# Patient Record
Sex: MALE | Race: WHITE | NOT HISPANIC OR LATINO | Employment: FULL TIME | URBAN - METROPOLITAN AREA
[De-identification: names, ages, dates, MRNs, and addresses within clinical notes are randomized per-mention and may not be internally consistent; named-entity substitution may affect disease eponyms.]

---

## 2017-03-29 DIAGNOSIS — F32.9 MAJOR DEPRESSIVE DISORDER, SINGLE EPISODE: ICD-10-CM

## 2017-03-29 DIAGNOSIS — M17.12 PRIMARY OSTEOARTHRITIS OF LEFT KNEE: ICD-10-CM

## 2017-03-29 DIAGNOSIS — R73.9 HYPERGLYCEMIA: ICD-10-CM

## 2017-04-03 ENCOUNTER — APPOINTMENT (OUTPATIENT)
Dept: LAB | Facility: MEDICAL CENTER | Age: 42
End: 2017-04-03
Payer: COMMERCIAL

## 2017-04-03 DIAGNOSIS — F32.9 MAJOR DEPRESSIVE DISORDER, SINGLE EPISODE: ICD-10-CM

## 2017-04-03 LAB
ALBUMIN SERPL BCP-MCNC: 3.9 G/DL (ref 3.5–5)
ALP SERPL-CCNC: 66 U/L (ref 46–116)
ALT SERPL W P-5'-P-CCNC: 75 U/L (ref 12–78)
ANION GAP SERPL CALCULATED.3IONS-SCNC: 7 MMOL/L (ref 4–13)
AST SERPL W P-5'-P-CCNC: 32 U/L (ref 5–45)
BASOPHILS # BLD AUTO: 0.04 THOUSANDS/ΜL (ref 0–0.1)
BASOPHILS NFR BLD AUTO: 1 % (ref 0–1)
BILIRUB SERPL-MCNC: 0.32 MG/DL (ref 0.2–1)
BILIRUB UR QL STRIP: NEGATIVE
BUN SERPL-MCNC: 11 MG/DL (ref 5–25)
CALCIUM SERPL-MCNC: 8.7 MG/DL (ref 8.3–10.1)
CHLORIDE SERPL-SCNC: 102 MMOL/L (ref 100–108)
CLARITY UR: NORMAL
CO2 SERPL-SCNC: 29 MMOL/L (ref 21–32)
COLOR UR: YELLOW
CREAT SERPL-MCNC: 0.91 MG/DL (ref 0.6–1.3)
EOSINOPHIL # BLD AUTO: 0.51 THOUSAND/ΜL (ref 0–0.61)
EOSINOPHIL NFR BLD AUTO: 7 % (ref 0–6)
ERYTHROCYTE [DISTWIDTH] IN BLOOD BY AUTOMATED COUNT: 12.8 % (ref 11.6–15.1)
GFR SERPL CREATININE-BSD FRML MDRD: >60 ML/MIN/1.73SQ M
GLUCOSE SERPL-MCNC: 143 MG/DL (ref 65–140)
GLUCOSE UR STRIP-MCNC: NEGATIVE MG/DL
HCT VFR BLD AUTO: 44.8 % (ref 36.5–49.3)
HGB BLD-MCNC: 15.1 G/DL (ref 12–17)
HGB UR QL STRIP.AUTO: NEGATIVE
KETONES UR STRIP-MCNC: NEGATIVE MG/DL
LEUKOCYTE ESTERASE UR QL STRIP: NEGATIVE
LYMPHOCYTES # BLD AUTO: 2.03 THOUSANDS/ΜL (ref 0.6–4.47)
LYMPHOCYTES NFR BLD AUTO: 28 % (ref 14–44)
MCH RBC QN AUTO: 29.5 PG (ref 26.8–34.3)
MCHC RBC AUTO-ENTMCNC: 33.7 G/DL (ref 31.4–37.4)
MCV RBC AUTO: 88 FL (ref 82–98)
MONOCYTES # BLD AUTO: 0.44 THOUSAND/ΜL (ref 0.17–1.22)
MONOCYTES NFR BLD AUTO: 6 % (ref 4–12)
NEUTROPHILS # BLD AUTO: 4.26 THOUSANDS/ΜL (ref 1.85–7.62)
NEUTS SEG NFR BLD AUTO: 58 % (ref 43–75)
NITRITE UR QL STRIP: NEGATIVE
NRBC BLD AUTO-RTO: 0 /100 WBCS
PH UR STRIP.AUTO: 6.5 [PH] (ref 4.5–8)
PLATELET # BLD AUTO: 266 THOUSANDS/UL (ref 149–390)
PMV BLD AUTO: 8.9 FL (ref 8.9–12.7)
POTASSIUM SERPL-SCNC: 4.5 MMOL/L (ref 3.5–5.3)
PROT SERPL-MCNC: 7.7 G/DL (ref 6.4–8.2)
PROT UR STRIP-MCNC: NEGATIVE MG/DL
RBC # BLD AUTO: 5.11 MILLION/UL (ref 3.88–5.62)
SODIUM SERPL-SCNC: 138 MMOL/L (ref 136–145)
SP GR UR STRIP.AUTO: 1.02 (ref 1–1.03)
TSH SERPL DL<=0.05 MIU/L-ACNC: 0.85 UIU/ML (ref 0.36–3.74)
UROBILINOGEN UR QL STRIP.AUTO: 0.2 E.U./DL
WBC # BLD AUTO: 7.31 THOUSAND/UL (ref 4.31–10.16)

## 2017-04-03 PROCEDURE — 36415 COLL VENOUS BLD VENIPUNCTURE: CPT

## 2017-04-03 PROCEDURE — 81003 URINALYSIS AUTO W/O SCOPE: CPT

## 2017-04-03 PROCEDURE — 84443 ASSAY THYROID STIM HORMONE: CPT

## 2017-04-03 PROCEDURE — 80053 COMPREHEN METABOLIC PANEL: CPT

## 2017-04-03 PROCEDURE — 85025 COMPLETE CBC W/AUTO DIFF WBC: CPT

## 2017-04-07 ENCOUNTER — ALLSCRIPTS OFFICE VISIT (OUTPATIENT)
Dept: OTHER | Facility: OTHER | Age: 42
End: 2017-04-07

## 2017-04-08 ENCOUNTER — APPOINTMENT (OUTPATIENT)
Dept: LAB | Facility: MEDICAL CENTER | Age: 42
End: 2017-04-08
Payer: COMMERCIAL

## 2017-04-08 DIAGNOSIS — R73.9 HYPERGLYCEMIA: ICD-10-CM

## 2017-04-08 LAB
EST. AVERAGE GLUCOSE BLD GHB EST-MCNC: 111 MG/DL
GLUCOSE P FAST SERPL-MCNC: 100 MG/DL (ref 65–99)
HBA1C MFR BLD: 5.5 % (ref 4.2–6.3)

## 2017-04-08 PROCEDURE — 82947 ASSAY GLUCOSE BLOOD QUANT: CPT

## 2017-04-08 PROCEDURE — 36415 COLL VENOUS BLD VENIPUNCTURE: CPT

## 2017-04-08 PROCEDURE — 83036 HEMOGLOBIN GLYCOSYLATED A1C: CPT

## 2017-04-26 ENCOUNTER — ALLSCRIPTS OFFICE VISIT (OUTPATIENT)
Dept: OTHER | Facility: OTHER | Age: 42
End: 2017-04-26

## 2017-04-26 ENCOUNTER — HOSPITAL ENCOUNTER (OUTPATIENT)
Dept: RADIOLOGY | Facility: OTHER | Age: 42
Discharge: HOME | End: 2017-04-26
Payer: COMMERCIAL

## 2017-04-26 DIAGNOSIS — M17.12 PRIMARY OSTEOARTHRITIS OF LEFT KNEE: ICD-10-CM

## 2017-04-26 PROCEDURE — 73564 X-RAY EXAM KNEE 4 OR MORE: CPT

## 2017-04-26 PROCEDURE — 73562 X-RAY EXAM OF KNEE 3: CPT

## 2017-07-04 ENCOUNTER — APPOINTMENT (EMERGENCY)
Dept: CT IMAGING | Facility: HOSPITAL | Age: 42
End: 2017-07-04
Payer: COMMERCIAL

## 2017-07-04 ENCOUNTER — APPOINTMENT (EMERGENCY)
Dept: MRI IMAGING | Facility: HOSPITAL | Age: 42
End: 2017-07-04
Payer: COMMERCIAL

## 2017-07-04 ENCOUNTER — HOSPITAL ENCOUNTER (EMERGENCY)
Facility: HOSPITAL | Age: 42
Discharge: HOME | End: 2017-07-04
Attending: EMERGENCY MEDICINE | Admitting: EMERGENCY MEDICINE
Payer: COMMERCIAL

## 2017-07-04 VITALS
TEMPERATURE: 99 F | WEIGHT: 220 LBS | SYSTOLIC BLOOD PRESSURE: 147 MMHG | OXYGEN SATURATION: 98 % | RESPIRATION RATE: 18 BRPM | HEART RATE: 67 BPM | DIASTOLIC BLOOD PRESSURE: 84 MMHG

## 2017-07-04 DIAGNOSIS — S09.90XA HEAD INJURY, ACUTE, WITHOUT LOSS OF CONSCIOUSNESS, INITIAL ENCOUNTER: Primary | ICD-10-CM

## 2017-07-04 PROCEDURE — 70553 MRI BRAIN STEM W/O & W/DYE: CPT

## 2017-07-04 PROCEDURE — 70450 CT HEAD/BRAIN W/O DYE: CPT

## 2017-07-04 PROCEDURE — 72125 CT NECK SPINE W/O DYE: CPT

## 2017-07-04 PROCEDURE — 99284 EMERGENCY DEPT VISIT MOD MDM: CPT

## 2017-07-04 PROCEDURE — 70486 CT MAXILLOFACIAL W/O DYE: CPT

## 2017-07-04 PROCEDURE — A9585 GADOBUTROL INJECTION: HCPCS | Performed by: PHYSICIAN ASSISTANT

## 2017-07-04 RX ORDER — SERTRALINE HYDROCHLORIDE 100 MG/1
100 TABLET, FILM COATED ORAL
COMMUNITY
End: 2018-10-17 | Stop reason: SDUPTHER

## 2017-07-04 RX ORDER — ONDANSETRON 4 MG/1
4 TABLET, ORALLY DISINTEGRATING ORAL ONCE
Status: COMPLETED | OUTPATIENT
Start: 2017-07-04 | End: 2017-07-04

## 2017-07-04 RX ORDER — ONDANSETRON 4 MG/1
4 TABLET, ORALLY DISINTEGRATING ORAL EVERY 8 HOURS PRN
Qty: 15 TABLET | Refills: 0 | Status: SHIPPED | OUTPATIENT
Start: 2017-07-04 | End: 2017-08-25

## 2017-07-04 RX ADMIN — GADOBUTROL 10 ML: 604.72 INJECTION INTRAVENOUS at 15:14

## 2017-07-04 RX ADMIN — ONDANSETRON 4 MG: 4 TABLET, ORALLY DISINTEGRATING ORAL at 12:23

## 2017-07-05 ENCOUNTER — ALLSCRIPTS OFFICE VISIT (OUTPATIENT)
Dept: OTHER | Facility: OTHER | Age: 42
End: 2017-07-05

## 2017-07-05 DIAGNOSIS — F07.81 POSTCONCUSSIONAL SYNDROME: ICD-10-CM

## 2017-07-05 DIAGNOSIS — E04.1 NONTOXIC SINGLE THYROID NODULE: ICD-10-CM

## 2017-07-05 DIAGNOSIS — D18.02 HEMANGIOMA OF INTRACRANIAL STRUCTURES (HCC): ICD-10-CM

## 2017-07-06 ENCOUNTER — TRANSCRIBE ORDERS (OUTPATIENT)
Dept: ADMINISTRATIVE | Facility: HOSPITAL | Age: 42
End: 2017-07-06

## 2017-07-06 ENCOUNTER — ALLSCRIPTS OFFICE VISIT (OUTPATIENT)
Dept: OTHER | Facility: OTHER | Age: 42
End: 2017-07-06

## 2017-07-06 ENCOUNTER — HOSPITAL ENCOUNTER (OUTPATIENT)
Dept: ULTRASOUND IMAGING | Facility: HOSPITAL | Age: 42
Discharge: HOME | End: 2017-07-06
Payer: COMMERCIAL

## 2017-07-06 DIAGNOSIS — D18.02 HEMANGIOMA OF INTRACRANIAL STRUCTURES (HCC): Primary | ICD-10-CM

## 2017-07-06 DIAGNOSIS — E04.1 NONTOXIC SINGLE THYROID NODULE: ICD-10-CM

## 2017-07-06 DIAGNOSIS — R51.9 FACIAL PAIN: ICD-10-CM

## 2017-07-06 PROCEDURE — 76536 US EXAM OF HEAD AND NECK: CPT

## 2017-07-11 ENCOUNTER — TRANSCRIBE ORDERS (OUTPATIENT)
Dept: ADMINISTRATIVE | Facility: HOSPITAL | Age: 42
End: 2017-07-11

## 2017-07-11 DIAGNOSIS — E04.1 NONTOXIC UNINODULAR GOITER: Primary | ICD-10-CM

## 2017-07-24 ENCOUNTER — HOSPITAL ENCOUNTER (OUTPATIENT)
Dept: CT IMAGING | Facility: HOSPITAL | Age: 42
Discharge: HOME | End: 2017-07-24
Attending: PSYCHIATRY & NEUROLOGY
Payer: COMMERCIAL

## 2017-07-24 ENCOUNTER — HOSPITAL ENCOUNTER (OUTPATIENT)
Dept: RADIOLOGY | Facility: HOSPITAL | Age: 42
Discharge: HOME | End: 2017-07-24
Payer: COMMERCIAL

## 2017-07-24 ENCOUNTER — HOSPITAL ENCOUNTER (OUTPATIENT)
Dept: NON INVASIVE DIAGNOSTICS | Facility: HOSPITAL | Age: 42
Discharge: HOME | End: 2017-07-24
Attending: PSYCHIATRY & NEUROLOGY
Payer: COMMERCIAL

## 2017-07-24 DIAGNOSIS — D18.02 HEMANGIOMA OF INTRACRANIAL STRUCTURES (HCC): ICD-10-CM

## 2017-07-24 DIAGNOSIS — R51.9 FACIAL PAIN: ICD-10-CM

## 2017-07-24 DIAGNOSIS — E04.1 NONTOXIC UNINODULAR GOITER: ICD-10-CM

## 2017-07-24 LAB
ATRIAL RATE: 70 BPM
P AXIS: 33 DEGREES
PR INTERVAL: 150 MS
QRS AXIS: 39 DEGREES
QRSD INTERVAL: 96 MS
QT INTERVAL: 396 MS
QTC INTERVAL: 427 MS
T WAVE AXIS: 11 DEGREES
VENTRICULAR RATE: 70 BPM

## 2017-07-24 PROCEDURE — 93005 ELECTROCARDIOGRAM TRACING: CPT

## 2017-07-24 PROCEDURE — 88173 CYTOPATH EVAL FNA REPORT: CPT | Performed by: DENTIST

## 2017-07-24 PROCEDURE — 10022 HB FNA W/IMAGE: CPT

## 2017-07-24 PROCEDURE — 88172 CYTP DX EVAL FNA 1ST EA SITE: CPT | Performed by: DENTIST

## 2017-07-24 PROCEDURE — 70496 CT ANGIOGRAPHY HEAD: CPT

## 2017-07-24 PROCEDURE — 76942 ECHO GUIDE FOR BIOPSY: CPT

## 2017-07-24 RX ORDER — LIDOCAINE HYDROCHLORIDE 10 MG/ML
2 INJECTION, SOLUTION INFILTRATION; PERINEURAL ONCE
Status: COMPLETED | OUTPATIENT
Start: 2017-07-24 | End: 2017-07-24

## 2017-07-24 RX ADMIN — LIDOCAINE HYDROCHLORIDE 2 ML: 10 INJECTION, SOLUTION INFILTRATION; PERINEURAL at 10:18

## 2017-07-24 RX ADMIN — IOHEXOL 85 ML: 350 INJECTION, SOLUTION INTRAVENOUS at 09:02

## 2017-07-30 ENCOUNTER — GENERIC CONVERSION - ENCOUNTER (OUTPATIENT)
Dept: OTHER | Facility: OTHER | Age: 42
End: 2017-07-30

## 2017-07-31 ENCOUNTER — ALLSCRIPTS OFFICE VISIT (OUTPATIENT)
Dept: OTHER | Facility: OTHER | Age: 42
End: 2017-07-31

## 2017-08-04 ENCOUNTER — APPOINTMENT (OUTPATIENT)
Dept: LAB | Facility: MEDICAL CENTER | Age: 42
End: 2017-08-04
Payer: COMMERCIAL

## 2017-08-04 ENCOUNTER — TRANSCRIBE ORDERS (OUTPATIENT)
Dept: ADMINISTRATIVE | Facility: HOSPITAL | Age: 42
End: 2017-08-04

## 2017-08-04 ENCOUNTER — ALLSCRIPTS OFFICE VISIT (OUTPATIENT)
Dept: OTHER | Facility: OTHER | Age: 42
End: 2017-08-04

## 2017-08-04 DIAGNOSIS — E04.1 NONTOXIC UNINODULAR GOITER: Primary | ICD-10-CM

## 2017-08-04 DIAGNOSIS — E04.1 NONTOXIC UNINODULAR GOITER: ICD-10-CM

## 2017-08-04 DIAGNOSIS — Z01.818 PRE-OP TESTING: ICD-10-CM

## 2017-08-04 LAB
ANION GAP SERPL CALCULATED.3IONS-SCNC: 9 MMOL/L (ref 4–13)
APTT PPP: 31 SECONDS (ref 23–35)
BASOPHILS # BLD AUTO: 0.03 THOUSANDS/ΜL (ref 0–0.1)
BASOPHILS NFR BLD AUTO: 0 % (ref 0–1)
BUN SERPL-MCNC: 15 MG/DL (ref 5–25)
CALCIUM SERPL-MCNC: 9.6 MG/DL (ref 8.3–10.1)
CHLORIDE SERPL-SCNC: 106 MMOL/L (ref 100–108)
CO2 SERPL-SCNC: 27 MMOL/L (ref 21–32)
CREAT SERPL-MCNC: 1.16 MG/DL (ref 0.6–1.3)
EOSINOPHIL # BLD AUTO: 0.14 THOUSAND/ΜL (ref 0–0.61)
EOSINOPHIL NFR BLD AUTO: 2 % (ref 0–6)
ERYTHROCYTE [DISTWIDTH] IN BLOOD BY AUTOMATED COUNT: 12.6 % (ref 11.6–15.1)
GFR SERPL CREATININE-BSD FRML MDRD: 79 ML/MIN/1.73SQ M
GLUCOSE SERPL-MCNC: 96 MG/DL (ref 65–140)
HCT VFR BLD AUTO: 45.3 % (ref 36.5–49.3)
HGB BLD-MCNC: 15.5 G/DL (ref 12–17)
INR PPP: 0.94 (ref 0.86–1.16)
LYMPHOCYTES # BLD AUTO: 2.03 THOUSANDS/ΜL (ref 0.6–4.47)
LYMPHOCYTES NFR BLD AUTO: 29 % (ref 14–44)
MCH RBC QN AUTO: 29.9 PG (ref 26.8–34.3)
MCHC RBC AUTO-ENTMCNC: 34.2 G/DL (ref 31.4–37.4)
MCV RBC AUTO: 88 FL (ref 82–98)
MONOCYTES # BLD AUTO: 0.43 THOUSAND/ΜL (ref 0.17–1.22)
MONOCYTES NFR BLD AUTO: 6 % (ref 4–12)
NEUTROPHILS # BLD AUTO: 4.26 THOUSANDS/ΜL (ref 1.85–7.62)
NEUTS SEG NFR BLD AUTO: 63 % (ref 43–75)
NRBC BLD AUTO-RTO: 0 /100 WBCS
PLATELET # BLD AUTO: 264 THOUSANDS/UL (ref 149–390)
PMV BLD AUTO: 9 FL (ref 8.9–12.7)
POTASSIUM SERPL-SCNC: 4.2 MMOL/L (ref 3.5–5.3)
PROTHROMBIN TIME: 12.6 SECONDS (ref 12.1–14.4)
RBC # BLD AUTO: 5.18 MILLION/UL (ref 3.88–5.62)
SODIUM SERPL-SCNC: 142 MMOL/L (ref 136–145)
WBC # BLD AUTO: 6.91 THOUSAND/UL (ref 4.31–10.16)

## 2017-08-04 PROCEDURE — 85025 COMPLETE CBC W/AUTO DIFF WBC: CPT

## 2017-08-04 PROCEDURE — 85610 PROTHROMBIN TIME: CPT

## 2017-08-04 PROCEDURE — 85730 THROMBOPLASTIN TIME PARTIAL: CPT

## 2017-08-04 PROCEDURE — 80048 BASIC METABOLIC PNL TOTAL CA: CPT

## 2017-08-04 PROCEDURE — 36415 COLL VENOUS BLD VENIPUNCTURE: CPT

## 2017-08-07 ENCOUNTER — GENERIC CONVERSION - ENCOUNTER (OUTPATIENT)
Dept: OTHER | Facility: OTHER | Age: 42
End: 2017-08-07

## 2017-08-25 RX ORDER — MULTIVITAMIN
1 TABLET ORAL DAILY
COMMUNITY
End: 2018-04-20 | Stop reason: SDUPTHER

## 2017-08-25 RX ORDER — AMITRIPTYLINE HYDROCHLORIDE 10 MG/1
25 TABLET, FILM COATED ORAL
COMMUNITY
End: 2017-11-22

## 2017-08-31 ENCOUNTER — ANESTHESIA (OUTPATIENT)
Dept: PERIOP | Facility: HOSPITAL | Age: 42
End: 2017-08-31
Payer: COMMERCIAL

## 2017-08-31 ENCOUNTER — HOSPITAL ENCOUNTER (OUTPATIENT)
Facility: HOSPITAL | Age: 42
Setting detail: OUTPATIENT SURGERY
Discharge: HOME | End: 2017-09-01
Attending: SURGERY | Admitting: SURGERY
Payer: COMMERCIAL

## 2017-08-31 ENCOUNTER — ANESTHESIA EVENT (OUTPATIENT)
Dept: PERIOP | Facility: HOSPITAL | Age: 42
End: 2017-08-31
Payer: COMMERCIAL

## 2017-08-31 VITALS
HEIGHT: 72 IN | HEART RATE: 85 BPM | OXYGEN SATURATION: 98 % | DIASTOLIC BLOOD PRESSURE: 66 MMHG | BODY MASS INDEX: 29.8 KG/M2 | WEIGHT: 220 LBS | RESPIRATION RATE: 16 BRPM | SYSTOLIC BLOOD PRESSURE: 121 MMHG | TEMPERATURE: 98.6 F

## 2017-08-31 DIAGNOSIS — E04.1 NONTOXIC SINGLE THYROID NODULE: ICD-10-CM

## 2017-08-31 PROCEDURE — 88307 TISSUE EXAM BY PATHOLOGIST: CPT | Performed by: SURGERY

## 2017-08-31 PROCEDURE — 88341 IMHCHEM/IMCYTCHM EA ADD ANTB: CPT | Performed by: SURGERY

## 2017-08-31 PROCEDURE — 88342 IMHCHEM/IMCYTCHM 1ST ANTB: CPT | Performed by: SURGERY

## 2017-08-31 PROCEDURE — C1765 ADHESION BARRIER: HCPCS | Performed by: SURGERY

## 2017-08-31 PROCEDURE — 88313 SPECIAL STAINS GROUP 2: CPT | Performed by: SURGERY

## 2017-08-31 RX ORDER — HEPARIN SODIUM 5000 [USP'U]/ML
5000 INJECTION, SOLUTION INTRAVENOUS; SUBCUTANEOUS EVERY 8 HOURS SCHEDULED
Status: DISCONTINUED | OUTPATIENT
Start: 2017-08-31 | End: 2017-09-01 | Stop reason: HOSPADM

## 2017-08-31 RX ORDER — ONDANSETRON 2 MG/ML
4 INJECTION INTRAMUSCULAR; INTRAVENOUS ONCE AS NEEDED
Status: DISCONTINUED | OUTPATIENT
Start: 2017-08-31 | End: 2017-08-31 | Stop reason: HOSPADM

## 2017-08-31 RX ORDER — OXYCODONE HYDROCHLORIDE 10 MG/1
10 TABLET ORAL EVERY 4 HOURS PRN
Status: DISCONTINUED | OUTPATIENT
Start: 2017-08-31 | End: 2017-09-01 | Stop reason: HOSPADM

## 2017-08-31 RX ORDER — ONDANSETRON 2 MG/ML
INJECTION INTRAMUSCULAR; INTRAVENOUS AS NEEDED
Status: DISCONTINUED | OUTPATIENT
Start: 2017-08-31 | End: 2017-08-31 | Stop reason: SURG

## 2017-08-31 RX ORDER — BUPIVACAINE HYDROCHLORIDE 2.5 MG/ML
INJECTION, SOLUTION INFILTRATION; PERINEURAL AS NEEDED
Status: DISCONTINUED | OUTPATIENT
Start: 2017-08-31 | End: 2017-08-31 | Stop reason: HOSPADM

## 2017-08-31 RX ORDER — SODIUM CHLORIDE 9 MG/ML
50 INJECTION, SOLUTION INTRAVENOUS CONTINUOUS
Status: DISCONTINUED | OUTPATIENT
Start: 2017-08-31 | End: 2017-08-31

## 2017-08-31 RX ORDER — SODIUM CHLORIDE, SODIUM LACTATE, POTASSIUM CHLORIDE, CALCIUM CHLORIDE 600; 310; 30; 20 MG/100ML; MG/100ML; MG/100ML; MG/100ML
50 INJECTION, SOLUTION INTRAVENOUS CONTINUOUS
Status: DISCONTINUED | OUTPATIENT
Start: 2017-08-31 | End: 2017-08-31

## 2017-08-31 RX ORDER — LIDOCAINE HYDROCHLORIDE 10 MG/ML
INJECTION, SOLUTION INFILTRATION; PERINEURAL AS NEEDED
Status: DISCONTINUED | OUTPATIENT
Start: 2017-08-31 | End: 2017-08-31 | Stop reason: SURG

## 2017-08-31 RX ORDER — ACETAMINOPHEN 325 MG/1
650 TABLET ORAL EVERY 6 HOURS PRN
Status: DISCONTINUED | OUTPATIENT
Start: 2017-08-31 | End: 2017-09-01 | Stop reason: HOSPADM

## 2017-08-31 RX ORDER — ROCURONIUM BROMIDE 10 MG/ML
INJECTION, SOLUTION INTRAVENOUS AS NEEDED
Status: DISCONTINUED | OUTPATIENT
Start: 2017-08-31 | End: 2017-08-31 | Stop reason: SURG

## 2017-08-31 RX ORDER — PROPOFOL 10 MG/ML
INJECTION, EMULSION INTRAVENOUS AS NEEDED
Status: DISCONTINUED | OUTPATIENT
Start: 2017-08-31 | End: 2017-08-31 | Stop reason: SURG

## 2017-08-31 RX ORDER — OXYCODONE HYDROCHLORIDE 5 MG/1
5 TABLET ORAL EVERY 4 HOURS PRN
Status: DISCONTINUED | OUTPATIENT
Start: 2017-08-31 | End: 2017-09-01 | Stop reason: HOSPADM

## 2017-08-31 RX ORDER — BUTALBITAL, ACETAMINOPHEN AND CAFFEINE 50; 325; 40 MG/1; MG/1; MG/1
1 TABLET ORAL EVERY 4 HOURS PRN
COMMUNITY

## 2017-08-31 RX ORDER — SODIUM CHLORIDE, SODIUM LACTATE, POTASSIUM CHLORIDE, CALCIUM CHLORIDE 600; 310; 30; 20 MG/100ML; MG/100ML; MG/100ML; MG/100ML
20 INJECTION, SOLUTION INTRAVENOUS CONTINUOUS
Status: DISCONTINUED | OUTPATIENT
Start: 2017-08-31 | End: 2017-08-31

## 2017-08-31 RX ORDER — FENTANYL CITRATE 50 UG/ML
INJECTION, SOLUTION INTRAMUSCULAR; INTRAVENOUS AS NEEDED
Status: DISCONTINUED | OUTPATIENT
Start: 2017-08-31 | End: 2017-08-31 | Stop reason: SURG

## 2017-08-31 RX ORDER — SERTRALINE HYDROCHLORIDE 100 MG/1
100 TABLET, FILM COATED ORAL
Status: DISCONTINUED | OUTPATIENT
Start: 2017-08-31 | End: 2017-09-01 | Stop reason: HOSPADM

## 2017-08-31 RX ORDER — ONDANSETRON 2 MG/ML
4 INJECTION INTRAMUSCULAR; INTRAVENOUS EVERY 4 HOURS PRN
Status: DISCONTINUED | OUTPATIENT
Start: 2017-08-31 | End: 2017-09-01 | Stop reason: HOSPADM

## 2017-08-31 RX ORDER — FENTANYL CITRATE/PF 50 MCG/ML
25 SYRINGE (ML) INJECTION
Status: DISCONTINUED | OUTPATIENT
Start: 2017-08-31 | End: 2017-08-31 | Stop reason: HOSPADM

## 2017-08-31 RX ORDER — AMITRIPTYLINE HYDROCHLORIDE 25 MG/1
25 TABLET, FILM COATED ORAL
Status: DISCONTINUED | OUTPATIENT
Start: 2017-08-31 | End: 2017-09-01 | Stop reason: HOSPADM

## 2017-08-31 RX ORDER — MIDAZOLAM HYDROCHLORIDE 1 MG/ML
INJECTION INTRAMUSCULAR; INTRAVENOUS AS NEEDED
Status: DISCONTINUED | OUTPATIENT
Start: 2017-08-31 | End: 2017-08-31 | Stop reason: SURG

## 2017-08-31 RX ORDER — KETOROLAC TROMETHAMINE 30 MG/ML
INJECTION, SOLUTION INTRAMUSCULAR; INTRAVENOUS AS NEEDED
Status: DISCONTINUED | OUTPATIENT
Start: 2017-08-31 | End: 2017-08-31 | Stop reason: SURG

## 2017-08-31 RX ORDER — DOCUSATE SODIUM 100 MG/1
100 CAPSULE, LIQUID FILLED ORAL 2 TIMES DAILY PRN
Status: DISCONTINUED | OUTPATIENT
Start: 2017-08-31 | End: 2017-09-01 | Stop reason: HOSPADM

## 2017-08-31 RX ADMIN — FENTANYL CITRATE 50 MCG: 50 INJECTION, SOLUTION INTRAMUSCULAR; INTRAVENOUS at 13:32

## 2017-08-31 RX ADMIN — MIDAZOLAM HYDROCHLORIDE 2 MG: 1 INJECTION, SOLUTION INTRAMUSCULAR; INTRAVENOUS at 12:34

## 2017-08-31 RX ADMIN — HEPARIN SODIUM 5000 UNITS: 5000 INJECTION, SOLUTION INTRAVENOUS; SUBCUTANEOUS at 21:12

## 2017-08-31 RX ADMIN — SODIUM CHLORIDE, SODIUM LACTATE, POTASSIUM CHLORIDE, AND CALCIUM CHLORIDE 20 ML/HR: .6; .31; .03; .02 INJECTION, SOLUTION INTRAVENOUS at 11:37

## 2017-08-31 RX ADMIN — ACETAMINOPHEN 650 MG: 325 TABLET, FILM COATED ORAL at 19:50

## 2017-08-31 RX ADMIN — OXYCODONE HYDROCHLORIDE 5 MG: 5 TABLET ORAL at 21:15

## 2017-08-31 RX ADMIN — SERTRALINE HYDROCHLORIDE 100 MG: 100 TABLET ORAL at 21:12

## 2017-08-31 RX ADMIN — DEXAMETHASONE SODIUM PHOSPHATE 10 MG: 10 INJECTION INTRAMUSCULAR; INTRAVENOUS at 13:04

## 2017-08-31 RX ADMIN — FENTANYL CITRATE 25 MCG: 50 INJECTION INTRAMUSCULAR; INTRAVENOUS at 15:45

## 2017-08-31 RX ADMIN — PROPOFOL 200 MG: 10 INJECTION, EMULSION INTRAVENOUS at 12:41

## 2017-08-31 RX ADMIN — ONDANSETRON 4 MG: 2 INJECTION INTRAMUSCULAR; INTRAVENOUS at 13:04

## 2017-08-31 RX ADMIN — FENTANYL CITRATE 25 MCG: 50 INJECTION INTRAMUSCULAR; INTRAVENOUS at 15:41

## 2017-08-31 RX ADMIN — HYDROMORPHONE HYDROCHLORIDE 0.2 MG: 1 INJECTION, SOLUTION INTRAMUSCULAR; INTRAVENOUS; SUBCUTANEOUS at 16:00

## 2017-08-31 RX ADMIN — KETOROLAC TROMETHAMINE 30 MG: 30 INJECTION, SOLUTION INTRAMUSCULAR at 14:35

## 2017-08-31 RX ADMIN — SODIUM CHLORIDE 50 ML/HR: 0.9 INJECTION, SOLUTION INTRAVENOUS at 16:19

## 2017-08-31 RX ADMIN — SODIUM CHLORIDE, SODIUM LACTATE, POTASSIUM CHLORIDE, AND CALCIUM CHLORIDE 50 ML/HR: .6; .31; .03; .02 INJECTION, SOLUTION INTRAVENOUS at 15:05

## 2017-08-31 RX ADMIN — FENTANYL CITRATE 100 MCG: 50 INJECTION, SOLUTION INTRAMUSCULAR; INTRAVENOUS at 12:41

## 2017-08-31 RX ADMIN — SODIUM CHLORIDE, SODIUM LACTATE, POTASSIUM CHLORIDE, AND CALCIUM CHLORIDE: .6; .31; .03; .02 INJECTION, SOLUTION INTRAVENOUS at 12:34

## 2017-08-31 RX ADMIN — HYDROMORPHONE HYDROCHLORIDE 0.2 MG: 1 INJECTION, SOLUTION INTRAMUSCULAR; INTRAVENOUS; SUBCUTANEOUS at 15:53

## 2017-08-31 RX ADMIN — FENTANYL CITRATE 50 MCG: 50 INJECTION, SOLUTION INTRAMUSCULAR; INTRAVENOUS at 14:51

## 2017-08-31 RX ADMIN — LIDOCAINE HYDROCHLORIDE 40 MG: 10 INJECTION, SOLUTION INFILTRATION; PERINEURAL at 12:41

## 2017-08-31 RX ADMIN — ROCURONIUM BROMIDE 50 MG: 10 INJECTION, SOLUTION INTRAVENOUS at 12:41

## 2017-08-31 RX ADMIN — HYDROMORPHONE HYDROCHLORIDE 0.2 MG: 1 INJECTION, SOLUTION INTRAMUSCULAR; INTRAVENOUS; SUBCUTANEOUS at 15:48

## 2017-08-31 RX ADMIN — AMITRIPTYLINE HYDROCHLORIDE 25 MG: 25 TABLET, FILM COATED ORAL at 21:12

## 2017-09-01 LAB
PLATELET # BLD AUTO: 252 THOUSANDS/UL (ref 149–390)
PMV BLD AUTO: 9.1 FL (ref 8.9–12.7)

## 2017-09-01 PROCEDURE — 85049 AUTOMATED PLATELET COUNT: CPT | Performed by: SURGERY

## 2017-09-01 RX ADMIN — HEPARIN SODIUM 5000 UNITS: 5000 INJECTION, SOLUTION INTRAVENOUS; SUBCUTANEOUS at 05:27

## 2017-09-07 ENCOUNTER — ALLSCRIPTS OFFICE VISIT (OUTPATIENT)
Dept: OTHER | Facility: OTHER | Age: 42
End: 2017-09-07

## 2017-09-15 ENCOUNTER — ALLSCRIPTS OFFICE VISIT (OUTPATIENT)
Dept: OTHER | Facility: OTHER | Age: 42
End: 2017-09-15

## 2017-10-02 ENCOUNTER — GENERIC CONVERSION - ENCOUNTER (OUTPATIENT)
Dept: OTHER | Facility: OTHER | Age: 42
End: 2017-10-02

## 2017-10-10 ENCOUNTER — ALLSCRIPTS OFFICE VISIT (OUTPATIENT)
Dept: OTHER | Facility: OTHER | Age: 42
End: 2017-10-10
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
## 2017-11-16 ENCOUNTER — ALLSCRIPTS OFFICE VISIT (OUTPATIENT)
Dept: OTHER | Facility: OTHER | Age: 42
End: 2017-11-16

## 2017-11-17 ENCOUNTER — ALLSCRIPTS OFFICE VISIT (OUTPATIENT)
Dept: OTHER | Facility: OTHER | Age: 42
End: 2017-11-17

## 2017-11-17 NOTE — PROGRESS NOTES
Assessment  1  External hemorrhoids (455 3) (K64 4)    Discussion/Summary    New onset symptomatic external hemorrhoid with evidence all ulceration on examination today: Plan is referral to surgery for consideration of treatment and follow-up  Chief Complaint  POSSIBLE HEMORROID      History of Present Illness  HPI: Christa Tavera is here today because over the last several days, he has noticed a lump in the anal area, with itching and burning and occasional blood on the tissue paper when he wipes  He moved his bowels twice a day and does not strain  He drinks plenty of fluids  He has no previous history of hemorrhoids  Active Problems  1  Allergic rhinitis (477 9) (J30 9)   2  Arthritis of left knee (716 96) (M17 12)   3  Bilateral hearing loss (389 9) (H91 93)   4  Blood glucose elevated (790 29) (R73 9)   5  Cavernous hemangioma of brain (228 02) (D18 02)   6  Cervical spondylosis without myelopathy (721 0) (M47 812)   7  Chronic low back pain (724 2,338 29) (M54 5,G89 29)   8  Chronic pain of left knee (169 91,297 17) (M25 562,G89 29)   9  Concussion syndrome (310 2) (F07 81)   10  Depression (311) (F32 9)   11  Elevated glucose (790 29) (R73 09)   12  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)   13  Thyroid nodule (241 0) (E04 1)   14  Umbilical hernia (643 8) (K42 9)   15  Venous hemangioma (228 00) (D18 00)   16  Worsening headaches (784 0) (R51)    Past Medical History  Active Problems And Past Medical History Reviewed: The active problems and past medical history were reviewed and updated today  Social History     · Completed college, bachelors degree   · Currently working   · Denied: History of Drug use   · Former smoker (I15 36) (S67 765)   · Full-time employment   ·    · Social alcohol use (Z78 9)   · Two children    Current Meds   1  Butalbital-APAP-Caffeine -40 MG Oral Capsule; TAKE 1 CAPSULE EVERY 4 HOURS AS NEEDED; Therapy: 31CVK8960 to (Evaluate:07Eti0116);  Last VR:95NIO3997 Ordered   2  Excedrin Extra Strength TABS; TAKE 1 TABLET 3 TIMES DAILY AS NEEDED; Therapy: (Recorded:28Hvk8735) to Recorded   3  Multi-Vitamin TABS; TAKE 1 TABLET DAILY; Therapy: (Recorded:80Jvq3793) to Recorded   4  Sertraline HCl - 100 MG Oral Tablet; take 1 tablet by mouth every day; Therapy: 81IUQ9699 to (071 2200)  Requested for: 30YYD7761; Last CL:89FCT0855 Ordered    The medication list was reviewed and updated today  Allergies  1  No Known Drug Allergies  2  No Known Environmental Allergies   3  No Known Food Allergies    Vitals   Recorded: 80INH1641 10:41AM   Heart Rate 72   Systolic 180   Diastolic 88   Height 5 ft    Weight 231 lb    BMI Calculated 45 11   BSA Calculated 1 98   O2 Saturation 99       Physical Exam   Constitutional Vital signs stable and in no acute distress  On examination the perineum, there is an obvious large external hemorrhoid, and there is a circular shallow ulceration of the proximal visible portion of the external hemorrhoid  Future Appointments    Date/Time Provider Specialty Site   09/06/2018 08:30 AM AARON Jenkins   Ne[facility]surgery [facility]   03/16/2018 03:30 PM Michelle Hernandez MD Surgical [facility] [place]       Signatures   Electronically signed by : AARON Salas ; Nov 16 2017 10:58AM EST                       (Author)

## 2017-11-18 NOTE — CONSULTS
Assessment  1  External hemorrhoids (455 3) (K64 4)   2  Hemorrhage of anus and rectum (569 3) (K62 5)   3  Umbilical hernia (059 5) (K42 9)    Plan  External hemorrhoids    · Lidocaine HCl - 2 % External Gel; APPLY AS DIRECTED   Rx By: Alexandria Fuentes; Dispense: 0 Days ; #:1 X 30 ML Tube; Refill: 2;External hemorrhoids; ROBSON = N; Verified Transmission to [id number] Last Updated By: [employer]; 11/17/2017 8:31:28 AM    Discussion/Summary  Discussion Summary:   ****72-year-old male with external hemorrhoid in superficial ulceration noted  Given this ulceration and the resistance of this hemorrhoid to conservative measures, we will start him immediately on lidocaine for pain control  Will plan external hemorrhoidectomy with biopsy and tissue specimen  Cannot rule out squamous cell or other abnormality of the anal tissue and therefore needed tissue sample as well as to definitively treat and removed this ulcerated skin  need for medical or cardiac clearance in the absence of a history with no previous reaction to anesthesia in this nonsmoker  External hemorrhoidectomy  offered him surgery as soon as possible, next week less than a week from now but given his occupation the very busy time of year that comes with [employer] and food distribution he elected to defer for another week until it is more convenient for her schedule  We discussed reasons for repeat evaluation and indications for presenting to the emergency department  Counseling Documentation With Imm: The patient was counseled regarding Importance of tissue biopsy  Goals and Barriers: The patient has the current Goals: Keep procedure as scheduled  The patent has the current Barriers: His work is not amenable to him taking off next week so we will delay surgery until the following week  Patient's Capacity to Self-Care: Patient is able to Self-Care  Patient Education: Educational resources provided: Hemorrhoid packet     Medication SE Review and Pt Understands Tx: Possible side effects of new medications were reviewed with the patient/guardian today  The treatment plan was reviewed with the patient/guardian  The patient/guardian understands and agrees with the treatment plan   Self Referrals:   Self Referrals: No      Chief Complaint  Chief Complaint Free Text Note Form: Hemmorriod consult, painful and bleeding anytime, not just with BM's  Started a week ago  Tried preparation h and warm sitz baths  Cream helped w/ some irritation but continues to bleed  History of Present Illness  HPI: 61-year-old male who presents for evaluation regarding hemorrhoid  He was seen yesterday by his primary care provider and diagnosed with an ulcerated external hemorrhoid and referred for surgical consultation  reports 2 weeks of conservative management of this external hemorrhoid with preparation H, Sitz bath with no relief  A little bit of bleeding, move his bowels regularly, twice per day without constipation or straining  prior hemorrhoids  No prior surgery on the rectal area  prior reaction to anesthesia  No cardiac history noted  works in food distribution and runs [employer], he notes next week with Thanksgiving would be the worst possible time for him to have a procedure in the out of commission  Review of Systems  Complete-Male:  Constitutional: no fever-- and-- no chills  Eyes: no eye pain  ENT: no earache  Cardiovascular: no chest pain-- and-- no palpitations  Respiratory: no cough  Gastrointestinal: no nausea  Genitourinary: no urinary hesitancy  Musculoskeletal: no joint swelling  Integumentary: no itching  Neurological: no numbness  Psychiatric: no anxiety  Endocrine: no muscle weakness  Hematologic/Lymphatic: no tendency for easy bleeding  Active Problems  1  Allergic rhinitis (477 9) (J30 9)   2  Arthritis of left knee (716 96) (M17 12)   3  Bilateral hearing loss (389 9) (H91 93)   4   Blood glucose elevated (790 29) (R73 9)   5  Cavernous hemangioma of brain (228 02) (D18 02)   6  Cervical spondylosis without myelopathy (721 0) (M47 812)   7  Chronic low back pain (724 2,338 29) (M54 5,G89 29)   8  Chronic pain of left knee (937 03,699 13) (M25 562,G89 29)   9  Concussion syndrome (310 2) (F07 81)   10  Depression (311) (F32 9)   11  Elevated glucose (790 29) (R73 09)   12  External hemorrhoids (455 3) (K64 4)   13  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)   14  Hemorrhage of anus and rectum (569 3) (K62 5)   15  Thyroid nodule (241 0) (E04 1)   16  Umbilical hernia (011 0) (K42 9)   17  Venous hemangioma (228 00) (D18 00)   18  Worsening headaches (784 0) (R51)    Past Medical History  1  History of Obstructive sleep apnea (327 23) (G47 33)  Active Problems And Past Medical History Reviewed: The active problems and past medical history were reviewed and updated today  Surgical History  1  History of Adenoidectomy   2  History of Arthroscopy Knee Left   3  History of Cornea Excimer Laser Photorefractive Keratectomy Bilaterally   4  History of Hernia Repair   5  History of Throat Surgery   6  History of Thyroid Surgery Substernal Thyroidectomy Partial   7  History of Tonsillectomy  Surgical History Reviewed: The surgical history was reviewed and updated today  Family History  Mother    1  Family history of cerebrovascular accident (CVA) (V17 1) (Z82 3)   2  Family history of depression (V17 0) (Z81 8)   3  Family history of hypertension (V17 49) (Z82 49)  Father    4  Family history of depression (V17 0) (Z81 8)   5  Family history of malignant melanoma (V16 8) (Z80 8)   6  Family history of Parkinson's disease (V17 2) (Z82 0)  Grandmother    7  Family history of cerebrovascular accident (CVA) (V17 1) (Z82 3)   8  Family history of depression (V17 0) (Z81 8)  Maternal Grandmother    9  Family history of Bone cancer  Paternal Grandfather    8   Family history of malignant neoplasm of prostate (V16 42) (Z80 42)  Family History Reviewed: The family history was reviewed and updated today  Social History     · Completed college, bachelors degree   · Currently working   · Denied: History of Drug use   · Former smoker (J52 74) (M46 913)   · Full-time employment   ·    · Social alcohol use (Z78 9)   · Two children  Social History Reviewed: The social history was reviewed and updated today  Current Meds   1  Butalbital-APAP-Caffeine -40 MG Oral Capsule; TAKE 1 CAPSULE EVERY 4 HOURS AS NEEDED; Therapy: 56IFU4287 to (Evaluate:56Miu8032); Last Rx:24Mbu4840 Ordered   2  Excedrin Extra Strength TABS; TAKE 1 TABLET 3 TIMES DAILY AS NEEDED; Therapy: (Recorded:25Moe7615) to Recorded   3  Multi-Vitamin TABS; TAKE 1 TABLET DAILY; Therapy: (Recorded:10Oct2017) to Recorded   4  Sertraline HCl - 100 MG Oral Tablet; take 1 tablet by mouth every day; Therapy: 48HMP9619 to 078 [id number] 4061)  Requested for: 99QGC3187; Last FC:22LRL6141 Ordered  Medication List Reviewed: The medication list was reviewed and updated today  Allergies  1  No Known Drug Allergies  2  No Known Environmental Allergies   3  No Known Food Allergies    Vitals  Vital Signs    Recorded: 24ALH4909 08:16AM   Temperature 97 1 F, Tympanic   Heart Rate 68, R Radial   Respiration 16   Systolic 766, LUE, Standing   Diastolic 80, LUE, Standing   Height 6 ft    Weight 231 lb    BMI Calculated 31 33   BSA Calculated 2 26       Physical Exam   Constitutional Pleasant well-appearing well-dressed no acute distress  Appears slightly uncomfortable  Avoiding sitting with pressure on his bottom  Eyes  Conjunctiva and lids: No swelling, erythema, or discharge  Pupils and irises: Equal, round and reactive to light  Sclera non-icteric  Ears, Nose, Mouth, and Throat  External inspection of ears and nose: Normal    Pulmonary  Respiratory effort: No increased work of breathing or signs of respiratory distress     Auscultation of lungs: Clear to auscultation, equal breath sounds bilaterally, no wheezes, no rales, no rhonci  Cardiovascular  Auscultation of heart: Normal rate and rhythm, normal S1 and S2, without murmurs  Abdomen Abdomen with 1-2 fingerbreadth reducible umbilical hernia  Nontender  Remainder abdomen soft and benign with normoactive bowel sounds  Musculoskeletal  Gait and station: Normal  -- Ambulating about the office without any tenderness  Skin  Skin and subcutaneous tissue: Normal without rashes or lesions  Psychiatric  Orientation to person, place and time: Normal    Mood and affect: Normal    Additional Exam:  External rectal examination reveals a 2 x 2 cm hemorrhoid, external stalk with a superficial ulceration that appears raw and almost necrotic  Remainder of external anal examination without evidence of stigmata of hemorrhoids  Future Appointments    Date/Time Provider Specialty Site   12/12/2017 08:45 AM Micky Brown [facility] General Surgery [place] [facility]   09/06/2018 08:30 AM AARON Schaffer   Neurosurgery [facility]   03/16/2018 03:30 PM Hilary Hale MD Surgical [facility] [place]       Signatures   Electronically signed by : Elizabet Charles [facility]; Nov 17 2017  8:40AM EST                       (Author)    Electronically signed by : Janis Wills MD; Nov 17 2017  9:38AM EST                       (Author)

## 2017-11-22 NOTE — PRE-PROCEDURE INSTRUCTIONS
Pre-Surgery Instructions:   Medication Instructions    butalbital-acetaminophen-caffeine (FIORICET,ESGIC) -40 mg per tablet Instructed patient per Anesthesia Guidelines   Multiple Vitamin (MULTIVITAMIN) tablet Instructed patient per Anesthesia Guidelines   sertraline (ZOLOFT) 100 mg tablet Instructed patient per Anesthesia Guidelines  Pt via phone given/reviewed [facility] preop instructions and he has chlorhexadine soap   Pt to hold asa/NSAIDS/vitamins/herbal supplements one week before surgery

## 2017-11-27 ENCOUNTER — ANESTHESIA EVENT (OUTPATIENT)
Dept: PERIOP | Facility: HOSPITAL | Age: 42
End: 2017-11-27
Payer: COMMERCIAL

## 2017-11-29 ENCOUNTER — ANESTHESIA (OUTPATIENT)
Dept: PERIOP | Facility: HOSPITAL | Age: 42
End: 2017-11-29
Payer: COMMERCIAL

## 2017-11-29 ENCOUNTER — HOSPITAL ENCOUNTER (OUTPATIENT)
Facility: HOSPITAL | Age: 42
Setting detail: OUTPATIENT SURGERY
Discharge: HOME | End: 2017-11-29
Attending: SURGERY | Admitting: SURGERY
Payer: COMMERCIAL

## 2017-11-29 VITALS
DIASTOLIC BLOOD PRESSURE: 63 MMHG | TEMPERATURE: 98.3 F | BODY MASS INDEX: 29.8 KG/M2 | HEART RATE: 75 BPM | SYSTOLIC BLOOD PRESSURE: 132 MMHG | RESPIRATION RATE: 16 BRPM | OXYGEN SATURATION: 97 % | HEIGHT: 72 IN | WEIGHT: 220 LBS

## 2017-11-29 DIAGNOSIS — K64.4 RESIDUAL HEMORRHOIDAL SKIN TAGS: ICD-10-CM

## 2017-11-29 PROCEDURE — 88304 TISSUE EXAM BY PATHOLOGIST: CPT | Performed by: SURGERY

## 2017-11-29 RX ORDER — ONDANSETRON 2 MG/ML
4 INJECTION INTRAMUSCULAR; INTRAVENOUS EVERY 4 HOURS PRN
Status: DISCONTINUED | OUTPATIENT
Start: 2017-11-29 | End: 2017-11-29 | Stop reason: HOSPADM

## 2017-11-29 RX ORDER — MIDAZOLAM HYDROCHLORIDE 1 MG/ML
INJECTION INTRAMUSCULAR; INTRAVENOUS AS NEEDED
Status: DISCONTINUED | OUTPATIENT
Start: 2017-11-29 | End: 2017-11-29 | Stop reason: SURG

## 2017-11-29 RX ORDER — SODIUM CHLORIDE 9 MG/ML
125 INJECTION, SOLUTION INTRAVENOUS CONTINUOUS
Status: DISCONTINUED | OUTPATIENT
Start: 2017-11-29 | End: 2017-11-29 | Stop reason: HOSPADM

## 2017-11-29 RX ORDER — LIDOCAINE HYDROCHLORIDE 10 MG/ML
INJECTION, SOLUTION INFILTRATION; PERINEURAL AS NEEDED
Status: DISCONTINUED | OUTPATIENT
Start: 2017-11-29 | End: 2017-11-29 | Stop reason: SURG

## 2017-11-29 RX ORDER — HYDROCODONE BITARTRATE AND ACETAMINOPHEN 5; 325 MG/1; MG/1
1 TABLET ORAL EVERY 4 HOURS PRN
Qty: 10 TABLET | Refills: 0 | Status: SHIPPED | OUTPATIENT
Start: 2017-11-29 | End: 2018-04-20 | Stop reason: ALTCHOICE

## 2017-11-29 RX ORDER — MORPHINE SULFATE 10 MG/ML
2 INJECTION, SOLUTION INTRAMUSCULAR; INTRAVENOUS EVERY 2 HOUR PRN
Status: DISCONTINUED | OUTPATIENT
Start: 2017-11-29 | End: 2017-11-29 | Stop reason: CLARIF

## 2017-11-29 RX ORDER — PROPOFOL 10 MG/ML
INJECTION, EMULSION INTRAVENOUS AS NEEDED
Status: DISCONTINUED | OUTPATIENT
Start: 2017-11-29 | End: 2017-11-29 | Stop reason: SURG

## 2017-11-29 RX ORDER — LIDOCAINE HYDROCHLORIDE AND EPINEPHRINE BITARTRATE 20; .01 MG/ML; MG/ML
INJECTION, SOLUTION SUBCUTANEOUS AS NEEDED
Status: DISCONTINUED | OUTPATIENT
Start: 2017-11-29 | End: 2017-11-29 | Stop reason: HOSPADM

## 2017-11-29 RX ORDER — ONDANSETRON 2 MG/ML
INJECTION INTRAMUSCULAR; INTRAVENOUS AS NEEDED
Status: DISCONTINUED | OUTPATIENT
Start: 2017-11-29 | End: 2017-11-29 | Stop reason: SURG

## 2017-11-29 RX ORDER — MORPHINE SULFATE 2 MG/ML
2 INJECTION, SOLUTION INTRAMUSCULAR; INTRAVENOUS EVERY 2 HOUR PRN
Status: DISCONTINUED | OUTPATIENT
Start: 2017-11-29 | End: 2017-11-29 | Stop reason: HOSPADM

## 2017-11-29 RX ORDER — FENTANYL CITRATE/PF 50 MCG/ML
50 SYRINGE (ML) INJECTION
Status: DISCONTINUED | OUTPATIENT
Start: 2017-11-29 | End: 2017-11-29 | Stop reason: HOSPADM

## 2017-11-29 RX ORDER — ONDANSETRON 2 MG/ML
4 INJECTION INTRAMUSCULAR; INTRAVENOUS ONCE AS NEEDED
Status: DISCONTINUED | OUTPATIENT
Start: 2017-11-29 | End: 2017-11-29 | Stop reason: HOSPADM

## 2017-11-29 RX ORDER — SUCCINYLCHOLINE CHLORIDE 20 MG/ML
INJECTION INTRAMUSCULAR; INTRAVENOUS AS NEEDED
Status: DISCONTINUED | OUTPATIENT
Start: 2017-11-29 | End: 2017-11-29 | Stop reason: SURG

## 2017-11-29 RX ORDER — SODIUM CHLORIDE, SODIUM LACTATE, POTASSIUM CHLORIDE, CALCIUM CHLORIDE 600; 310; 30; 20 MG/100ML; MG/100ML; MG/100ML; MG/100ML
80 INJECTION, SOLUTION INTRAVENOUS CONTINUOUS
Status: DISCONTINUED | OUTPATIENT
Start: 2017-11-29 | End: 2017-11-29 | Stop reason: HOSPADM

## 2017-11-29 RX ORDER — MEPERIDINE HYDROCHLORIDE 50 MG/ML
12.5 INJECTION INTRAMUSCULAR; INTRAVENOUS; SUBCUTANEOUS ONCE AS NEEDED
Status: DISCONTINUED | OUTPATIENT
Start: 2017-11-29 | End: 2017-11-29 | Stop reason: HOSPADM

## 2017-11-29 RX ORDER — ACETAMINOPHEN 325 MG/1
650 TABLET ORAL EVERY 6 HOURS PRN
Status: DISCONTINUED | OUTPATIENT
Start: 2017-11-29 | End: 2017-11-29 | Stop reason: HOSPADM

## 2017-11-29 RX ORDER — FENTANYL CITRATE 50 UG/ML
INJECTION, SOLUTION INTRAMUSCULAR; INTRAVENOUS AS NEEDED
Status: DISCONTINUED | OUTPATIENT
Start: 2017-11-29 | End: 2017-11-29 | Stop reason: SURG

## 2017-11-29 RX ORDER — HYDROCODONE BITARTRATE AND ACETAMINOPHEN 5; 325 MG/1; MG/1
1 TABLET ORAL EVERY 4 HOURS PRN
Status: DISCONTINUED | OUTPATIENT
Start: 2017-11-29 | End: 2017-11-29 | Stop reason: HOSPADM

## 2017-11-29 RX ADMIN — FENTANYL CITRATE 50 MCG: 50 INJECTION INTRAMUSCULAR; INTRAVENOUS at 14:21

## 2017-11-29 RX ADMIN — PROPOFOL 200 MG: 10 INJECTION, EMULSION INTRAVENOUS at 14:29

## 2017-11-29 RX ADMIN — DEXAMETHASONE SODIUM PHOSPHATE 4 MG: 10 INJECTION INTRAMUSCULAR; INTRAVENOUS at 14:39

## 2017-11-29 RX ADMIN — SODIUM CHLORIDE 125 ML/HR: 0.9 INJECTION, SOLUTION INTRAVENOUS at 12:37

## 2017-11-29 RX ADMIN — SUCCINYLCHOLINE CHLORIDE 100 MG: 20 INJECTION, SOLUTION INTRAMUSCULAR; INTRAVENOUS at 14:29

## 2017-11-29 RX ADMIN — FENTANYL CITRATE 100 MCG: 50 INJECTION INTRAMUSCULAR; INTRAVENOUS at 14:40

## 2017-11-29 RX ADMIN — ONDANSETRON HYDROCHLORIDE 4 MG: 2 INJECTION, SOLUTION INTRAVENOUS at 14:39

## 2017-11-29 RX ADMIN — HYDROCODONE BITARTRATE AND ACETAMINOPHEN 1 TABLET: 5; 325 TABLET ORAL at 16:12

## 2017-11-29 RX ADMIN — LIDOCAINE HYDROCHLORIDE 100 MG: 10 INJECTION, SOLUTION INFILTRATION; PERINEURAL at 14:29

## 2017-11-29 RX ADMIN — MIDAZOLAM HYDROCHLORIDE 2 MG: 1 INJECTION, SOLUTION INTRAMUSCULAR; INTRAVENOUS at 14:21

## 2017-11-29 RX ADMIN — CEFAZOLIN SODIUM 2000 MG: 2 SOLUTION INTRAVENOUS at 14:33

## 2017-11-29 RX ADMIN — FENTANYL CITRATE 100 MCG: 50 INJECTION INTRAMUSCULAR; INTRAVENOUS at 14:29

## 2017-11-29 NOTE — DISCHARGE INSTRUCTIONS
[facility] Instructions  Dr Yenni Leung MD, FACS    Sitz baths or warm water bowels are fine  May shower  Use stool softeners or laxatives liberally  1  General: You will feel pulling sensations around the wound or funny aches and pains around the incisions  This is normal  Even minor surgery is a change in your body and this is your bodys way of reaction to it  If you have had abdominal surgery, it may help to support the incision with a small pillow or blanket for comfort when moving or coughing  2  Wound care: Make sure to remove the bandage in about 24 hours, unless instructed otherwise  You usually don't have to redress the wound after 24-48 hours, unless for comfort  Keep the incision clean and dry  Let air get to it  If this Steri-Strips fall off, just keep the wound clean  3  Water: You may shower over the wound, unless there are drain tubes left in place  Do not bathe or use a pool or hot tub until cleared by the physician  You may shower right over the staples or Steri-Strips and packing dry when you are done  4  Activity: You may go up and down stairs, walk as much as you are comfortable, but walk at least 3 times each day  If you have had abdominal surgery, do not lift anything heavier than 15 pounds for at least 2-4 weeks, unless cleared by the doctor  5  Diet: You may resume a regular diet  If you had a same-day surgery or overnight stay surgery, you may wish to eat lightly for a few days: soups, crackers, and sandwiches  You may resume a regular diet when ready  6  Medications: Resume all of your previous medications, unless told otherwise by the doctor  Avoid aspirin or ibuprofen (Advil, Motrin, etc ) products for 2-3 days after the date of surgery  You may, at that time, began to take them again   Tylenol is always fine, unless you are taking any narcotic pain medication containing Tylenol (such as Percocet, Darvocet, Vicodin, or anything containing acetaminophen)  Do not take Tylenol if you're taking these medications  You do not need to take the narcotic pain medications unless you are having significant pain and discomfort  7  Driving: You will need someone to drive you home on the day of surgery  Do not drive or make any important decisions while on narcotic pain medication or 24 hours and after anesthesia or sedation for surgery  Generally, you may drive when your off all narcotic pain medications  8  Upset Stomach: You may take Maalox, Tums, or similar items for an upset stomach  If your narcotic pain medication causes an upset stomach, do not take it on an empty stomach  Try taking it with at least some crackers or toast      9  Constipation: Patients often experienced constipation after surgery  You may take over-the-counter medication for this, such as Metamucil, Senokot, Dulcolax, milk of magnesia, etc  You may take a suppository unless you have had anorectal surgery such as a procedure on your hemorrhoids  If you experience significant nausea or vomiting after abdominal surgery, call the office before trying any of these medications  10  Call the office: If you are experiencing any of the following, fevers above 101 5°, significant nausea or vomiting, if the wound develops drainage and/or is excessive redness around the wound, or if you have significant diarrhea or other worsening symptoms  11  Pain: You may be given a prescription for pain  This will be given to the hospital, the day of surgery  12  Sexual Activity: You may resume sexual activity when you feel ready and comfortable and your incision is sealed and healed without apparent infection risk  13  Urination: If you haven't urinated in 6 hours, go directly to the ER for evaluation for urinary retention       Jerson Garcia, [address]  [place], [address]   Phone: [phone]

## 2017-11-29 NOTE — OP NOTE
HEMORRHOIDECTOMY EXCISION EXTERNAL AND INTERNAL  Postoperative Note  PATIENT NAME: Romana Heart  : 1975  MRN: [id number]  AL OR ROOM 04    Surgery Date: 2017    Pre operative diagnosis:   Residual hemorrhoidal skin tags [K64 4]    Operative Indications:  Symptomatic hemorrhoids    Operative Findings:  One large thrombosed  And ulcerated hemorrhoid excised     Consent:  The risks, benefits, and alternatives to the surgery were discussed with the patient and with the family prior to surgery if present, personally by Dr Daisy Clinton  If the consent was obtained by the physician assistant or other representative, the consent was reviewed once again personally by the operating physician  Common complications particular for this procedure as well as unusual complications were discussed, including but not limited to:  bleeding, wound infection, prolonged wound healing, open wounds, reoperation, leak from the bowel or viscus, leak from the bile duct or injury to adjacent or other organs or blood vessels in the abdomen  Anal fecal incontinence was also discussed as a possible complication  A  was used if necessary  The patient expressed understanding of the issues discussed and wished and consented to the procedure to proceed  All questions were answered  Dr Daisy Clinton personally discussed the informed consent with this patient  Post operative diagnosis and findings:   Post-Op Diagnosis Codes:     * Residual hemorrhoidal skin tags [K64 4]    Procedure:   Procedure(s): HEMORRHOIDECTOMY EXCISION EXTERNAL AND INTERNAL    Surgeon(s) and Role:     * Filemon Najera MD - Primary     * Huy De León DO - Assisting    The Physician Assistant was medically necessary for surgical safety the case including suturing, retraction, and hemostasis  No qualified resident was available  I was present for the entire procedure       Drains:       Specimens:  ID Type Source Tests Collected by Time Destination   1 : HEMORROID TISSUE  INTERNAL AND EXTERNAL Tissue Rectal mucosa TISSUE EXAM Julio Garza MD 2017 [id number]        Estimated Blood Loss:   Minimal    Anesthesia Type:   Choice     Procedure:  After discussion and acceptance of all risks, benefits and alternatives, And confirming the surgical site in the holding room, the patient was taken to the operating room  The patient was placed under anesthesia at the [facility] of the anesthesia department  The patient was placed in the jackknife position and positioned by anesthesia  The leonard-anal skin was prepared with a solution of dilute betadine  A timeout was performed confirming patient name, , and procedure  A leonard-anal block lidocaine with epinephrine was infiltrated  Hill Mccollum anoscope was inserted and the hemorrhoidal petechial defined  The local anesthesia was used to elevate the hemorrhoid  The hemorrhoid was carefully clamped with a Annette clamp on the internal aspect  This was then secured using a 2-0 chromic suture  The hemorrhoid was excised  Great care was taken to avoid injury to the sphincter muscles  Scissors, knife, and cautery were used  The wound was closed in a linear radial fashion with a continuous 2-0 Chromic suture and hemostasis was assured  A tiny few dots of Histoacryl was used to cover the suture line ensuring that the anal skin did not stick together  The anal canal was dressed using lidocaine jelly  A dressing was applied  Some portions of this record may have been generated with voice recognition software  There may be translation, syntax,  or grammatical errors  Occasional wrong word or "sound-a-like" substitutions may have occurred due to the inherent limitations of the voice recognition software  Read the chart carefully and recognize, using context, where substations may have occurred  If you have any questions, please contact the dictating provider for clarification or correction, as needed  Complications: None    Condition: Stable to PACU    SIGNATURE: Lester Jesus MD   DATE: November 29, 2017   TIME: 2:57 PM

## 2017-11-29 NOTE — ANESTHESIA PREPROCEDURE EVALUATION
Review of Systems/Medical History  Patient summary reviewed  Chart reviewed  No history of anesthetic complications     Cardiovascular  Negative cardio ROS    Pulmonary  Sleep apnea , ,        GI/Hepatic  Negative GI/hepatic ROS               Endo/Other  History of thyroid disease (follicular neoplasm) , Arthritis  Comment: S/P Right thyroid lobectomy   GYN       Hematology  Negative hematology ROS      Musculoskeletal  Negative musculoskeletal ROS        Neurology    Headaches,    Psychology   Depression , being treated for depression,            Physical Exam    Airway    Mallampati score: II  TM Distance: >3 FB  Neck ROM: full     Dental   No notable dental hx     Cardiovascular  Comment: Negative ROS, Rhythm: regular, Rate: normal, Cardiovascular exam normal    Pulmonary  Pulmonary exam normal Breath sounds clear to auscultation,     Other Findings        Anesthesia Plan  ASA Score- 2       Anesthesia Type- general with ASA Monitors  Additional Monitors:   Airway Plan:           Induction- intravenous  Informed Consent- Anesthetic plan and risks discussed with patient

## 2017-11-29 NOTE — ANESTHESIA POSTPROCEDURE EVALUATION
Post-Op Assessment Note      CV Status:  Stable    Mental Status:  Alert and awake    Hydration Status:  Euvolemic    PONV Controlled:  Controlled    Airway Patency:  Patent    Post Op Vitals Reviewed: Yes          Staff: Anesthesiologist           BP (P) 135/62 (11/29/17 1554)    Temp      Pulse (P) 77 (11/29/17 1554)   Resp (P) 16 (11/29/17 1554)    SpO2 (P) 97 % (11/29/17 1554)

## 2017-11-29 NOTE — DISCHARGE SUMMARY
Discharge Summary - Zohra Frye 38 y o  male MRN: [id number]    Unit/Bed#: OR [place] Encounter: [id number]      Pre-Operative Diagnosis: Pre-Op Diagnosis Codes:     * Residual hemorrhoidal skin tags [K64 4]    Post-Operative Diagnosis: Post-Op Diagnosis Codes:     * Residual hemorrhoidal skin tags [K64 4]    Procedures Performed:  Procedure(s): HEMORRHOIDECTOMY EXCISION EXTERNAL AND INTERNAL    Surgeon: Roby Mathew MD    See H & P for full details of admission and Operative Note for full details of operations performed  Patient was seen and examined prior to discharge  Provisions for Follow-Up Care:  See After Visit Summary for information related to follow-up care and home orders  Disposition: Home, in stable condition  Planned Readmission: No    Discharge Medications:  See after visit summary for reconciled discharge medications provided to patient and family  Post Operative instructions: Reviewed with patient and/or family  Some portions of this record may have been generated with voice recognition software  There may be translation, syntax,  or grammatical errors  Occasional wrong word or "sound-a-like" substitutions may have occurred due to the inherent limitations of the voice recognition software  Read the chart carefully and recognize, using context, where substations may have occurred  If you have any questions, please contact the dictating provider for clarification or correction, as needed       Signature:   Roby Mathew MD  Date: 11/29/2017 Time: 2:58 PM

## 2017-12-04 ENCOUNTER — GENERIC CONVERSION - ENCOUNTER (OUTPATIENT)
Dept: OTHER | Facility: OTHER | Age: 42
End: 2017-12-04

## 2017-12-06 ENCOUNTER — GENERIC CONVERSION - ENCOUNTER (OUTPATIENT)
Dept: OTHER | Facility: OTHER | Age: 42
End: 2017-12-06

## 2017-12-08 ENCOUNTER — GENERIC CONVERSION - ENCOUNTER (OUTPATIENT)
Dept: OTHER | Facility: OTHER | Age: 42
End: 2017-12-08

## 2017-12-12 ENCOUNTER — ALLSCRIPTS OFFICE VISIT (OUTPATIENT)
Dept: OTHER | Facility: OTHER | Age: 42
End: 2017-12-12

## 2017-12-14 NOTE — PROGRESS NOTES
Assessment    1  History of External hemorrhoids (455 3) (K64 4)    Discussion/Summary    61-year-old male for postoperative check after external hemorrhoidectomy for ulcerated external hemorrhoid  Pathology consistent with benign external hemorrhoid  Reviewed with the patient  does also have a very small umbilical hernia noted on exam before his hemorrhoidectomy  We discussed signs and symptoms of obstruction and indications for repair  He wishes to delay for now and if this is increasingly symptomatic or problematic he will come back to see us to schedule repair  back on an as-needed basis  Chief Complaint  Patient is here today for his first post op appointment  He had a hemorrhoidectomy excision on 11/29/2017  Post-Op  HPI: 61-year-old male status post hemorrhoidectomy on November 29, 2017  Had some significant postoperative anal pain which is now resolved  He is moving his bowels okay  No bleeding  Review of Systems   Constitutional: no fever-- and-- no chills  Active Problems  1  Allergic rhinitis (477 9) (J30 9)   2  Arthritis of left knee (716 96) (M17 12)   3  Bilateral hearing loss (389 9) (H91 93)   4  Blood glucose elevated (790 29) (R73 9)   5  Cavernous hemangioma of brain (228 02) (D18 02)   6  Cervical spondylosis without myelopathy (721 0) (M47 812)   7  Chronic low back pain (724 2,338 29) (M54 5,G89 29)   8  Chronic pain of left knee (748 74,388 21) (M25 562,G89 29)   9  Concussion syndrome (310 2) (F07 81)   10  Depression (311) (F32 9)   11  Elevated glucose (790 29) (R73 09)   12  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)   13  Hemorrhage of anus and rectum (569 3) (K62 5)   14  Thyroid nodule (241 0) (E04 1)   15  Umbilical hernia (362 0) (K42 9)   16  Venous hemangioma (228 00) (D18 00)   17   Worsening headaches (784 0) (R51)    Social History     · Completed college, bachelors degree   · Currently working   · Denied: History of Drug use   · Former smoker (V15 82) (T72 341)   · Full-time employment   ·    · Social alcohol use (Z78 9)   · Two children    Current Meds   1  Butalbital-APAP-Caffeine -40 MG Oral Capsule; TAKE 1 CAPSULE EVERY 4 HOURS AS NEEDED; Therapy: 79NKX3960 to (Evaluate:27Hnb7811); Last Rx:60Cdf2907 Ordered   2  Excedrin Extra Strength TABS; TAKE 1 TABLET 3 TIMES DAILY AS NEEDED; Therapy: (Recorded:2017) to Recorded   3  Lidocaine HCl - 2 % External Gel; APPLY AS DIRECTED; Therapy: 92VMI4506 to (Last Rx:2017)  Requested for: 23KSS0192 Ordered   4  Multi-Vitamin TABS; TAKE 1 TABLET DAILY; Therapy: (Recorded:2017) to Recorded   5  Sertraline HCl - 100 MG Oral Tablet; take 1 tablet by mouth every day; Therapy: 77WCZ8421 to (Evaluate:2018)  Requested for: 56KOC7677; Last [id number] Ordered    Allergies  1  No Known Drug Allergies  2  No Known Environmental Allergies   3  No Known Food Allergies    Vitals   Recorded: 2017 08:13AM   Temperature 97 9 F   Heart Rate 72   Respiration 16   Systolic 763   Diastolic 76   Height 6 ft    Weight 229 lb 8 oz   BMI Calculated 31 13   BSA Calculated 2 26       Physical Exam   Constitutional Pleasant well-appearing 29-year-old male  No acute distress  Additional Exam:  External rectal examination with well-healed area from prior hemorrhoidectomy  No ulceration or open draining area  Scar tissue with good healing and approximation is appreciated  Future Appointments    Date/Time Provider Specialty Site   2018 08:30 AM AARON Abbott   [facility]surgery [facility]   2018 03:30 PM Robson Mario MD Surgical [facility] [place]       Signatures   Electronically signed by : Virlinda Lombard, [facility]; Dec 12 2017  8:21AM EST                       (Author)    Electronically signed by : Suzie Reyna MD; Dec 13 2017  9:57AM EST

## 2018-01-12 VITALS
DIASTOLIC BLOOD PRESSURE: 72 MMHG | WEIGHT: 231.25 LBS | HEIGHT: 72 IN | SYSTOLIC BLOOD PRESSURE: 132 MMHG | BODY MASS INDEX: 31.32 KG/M2 | HEART RATE: 77 BPM

## 2018-01-12 VITALS
SYSTOLIC BLOOD PRESSURE: 138 MMHG | HEART RATE: 80 BPM | BODY MASS INDEX: 30.42 KG/M2 | DIASTOLIC BLOOD PRESSURE: 91 MMHG | WEIGHT: 224.56 LBS | HEIGHT: 72 IN

## 2018-01-12 VITALS
HEART RATE: 80 BPM | DIASTOLIC BLOOD PRESSURE: 88 MMHG | BODY MASS INDEX: 30.78 KG/M2 | HEIGHT: 72 IN | SYSTOLIC BLOOD PRESSURE: 118 MMHG | RESPIRATION RATE: 14 BRPM | TEMPERATURE: 98.2 F | WEIGHT: 227.25 LBS

## 2018-01-12 VITALS
WEIGHT: 230 LBS | SYSTOLIC BLOOD PRESSURE: 122 MMHG | HEART RATE: 89 BPM | DIASTOLIC BLOOD PRESSURE: 84 MMHG | OXYGEN SATURATION: 96 % | BODY MASS INDEX: 31.19 KG/M2

## 2018-01-12 VITALS
DIASTOLIC BLOOD PRESSURE: 62 MMHG | WEIGHT: 226.13 LBS | RESPIRATION RATE: 16 BRPM | HEART RATE: 80 BPM | HEIGHT: 72 IN | BODY MASS INDEX: 30.63 KG/M2 | SYSTOLIC BLOOD PRESSURE: 122 MMHG

## 2018-01-12 VITALS
RESPIRATION RATE: 15 BRPM | OXYGEN SATURATION: 98 % | HEIGHT: 72 IN | HEART RATE: 92 BPM | WEIGHT: 230 LBS | DIASTOLIC BLOOD PRESSURE: 82 MMHG | TEMPERATURE: 98.4 F | SYSTOLIC BLOOD PRESSURE: 124 MMHG | BODY MASS INDEX: 31.15 KG/M2

## 2018-01-12 VITALS — SYSTOLIC BLOOD PRESSURE: 125 MMHG | HEART RATE: 83 BPM | WEIGHT: 232.5 LBS | DIASTOLIC BLOOD PRESSURE: 85 MMHG

## 2018-01-13 VITALS
TEMPERATURE: 97.1 F | BODY MASS INDEX: 31.29 KG/M2 | DIASTOLIC BLOOD PRESSURE: 80 MMHG | HEIGHT: 72 IN | HEART RATE: 68 BPM | RESPIRATION RATE: 16 BRPM | WEIGHT: 231 LBS | SYSTOLIC BLOOD PRESSURE: 130 MMHG

## 2018-01-13 VITALS
BODY MASS INDEX: 31.02 KG/M2 | OXYGEN SATURATION: 98 % | HEART RATE: 83 BPM | RESPIRATION RATE: 15 BRPM | DIASTOLIC BLOOD PRESSURE: 84 MMHG | TEMPERATURE: 95.4 F | HEIGHT: 72 IN | SYSTOLIC BLOOD PRESSURE: 128 MMHG | WEIGHT: 229 LBS

## 2018-01-13 NOTE — PROGRESS NOTES
Preliminary Nursing Report                Patient Information    Initial Encounter Entry Date:   2017 10:16 AM EST (Automated Transmission Automated Transmission)       Last Modified:   {Evita Licea}              Legal Name: Casey Olmos Number:        YOB: 1975        Age (years): 46        Gender: M        Body Mass Index (BMI): 31 kg/m2        Height: 72 in  Weight: 230 lbs (104 kgs)           Address:   [address] [place] [address],[address]              Phone: [phone]   (consent to leave messages)        Email:        Ethnicity: Decline to State        Muslim:        Marital Status:        Preferred Language: English        Race: Other Race                    Patient Insurance Information        Primary Insurance Information Carrier Name: {Primary  CarrierName}           Carrier Address:   {Primary  CarrierAddress}              Carrier Phone: {Primary  CarrierPhone}          Group Number: {Primary  GroupNumber}          Policy Number: {Primary  PolicyNumber}          Insured Name: {Primary  InsuredName}          Insured : {Primary  InsuredDOB}          Relationship to Insured: {Primary  RelationshiptoInsured}           Secondary Insurance Information Carrier Name: {Secondary  CarrierName}           Carrier Address:   {Secondary  CarrierAddress}              Carrier Phone: {Secondary  CarrierPhone}          Group Number: {Secondary  GroupNumber}          Policy Number: {Secondary  PolicyNumber}          Insured Name: {Secondary  InsuredName}          Insured : {Secondary  InsuredDOB}          Relationship to Insured: {Secondary  RelationshiptoInsured}                       Health Profile   Booking #:   Lucy Apodaca #: [id number]               DOS: 2017    Surgery : TOTAL THYROID LOBECTOMY, UNILATERAL    Add'l Procedures/Notes:     Surgery Risk: Intermediate          Precautions          Allergies    No Known Drug Allergies Medications    Amitriptyline HCl - 10 MG Oral Tablet       Butalbital-APAP-Caffeine -40 MG Oral Capsule       Excedrin Extra Strength TABS       Sertraline HCl - 100 MG Oral Tablet       TraMADol HCl - 50 MG Oral Tablet               Conditions    Allergic rhinitis       Arthritis of left knee       Bilateral hearing loss       Blood glucose elevated       Cavernous hemangioma of brain       Cervical spondylosis without myelopathy       Chronic low back pain       Chronic pain of left knee       Concussion syndrome       Depression       Elevated glucose       Follicular neoplasm of thyroid       Thyroid nodule       Umbilical hernia       Venous hemangioma       Worsening headaches               Family History    None             Surgical History    None             Social History    Currently working       Former smoker       Social alcohol use                               Patient Instructions       Medical Procedure Risk  NPO Instructions   The day before surgery it is recommended to have a light dinner at your usual time and you are allowed a light snack early in the evening  Do not eat anything heavy or eat a big meal after 7pm  Do not eat or drink anything after midnight prior to your surgery  If you are supposed to take any of your medications, do so with a sip of water  Failure to follow these instructions can lead to an increased risk of lung complications and may result in a delay or cancellation of your procedure  If you have any questions, contact your institution for further instructions  No candy, no gum, no mints, no chewing tobacco          TraMADol HCl - 50 MG Oral Tablet  Medication Instruction (Opioids - Pain Medication) 62  Please continue the following medications, if needed, up to and including the day of surgery (with a sip of water)           Sertraline HCl - 100 MG Oral Tablet  Medication Instruction (SSRI - Antidepressants) 80  Please continue to take this medication on your normal schedule  If this is an oral medication and you take in the morning, you may do so with a sip of water  Testing Considerations       ? Complete Blood Count (CBC) t  If test was completed and normal within last six months, repeat test is not necessary  Triggered by: Thyroid nodule         ? Comprehensive Metabolic Panel (CMP) t  If test was completed and normal within last six months, repeat test is not necessary  Triggered by: Thyroid nodule         ? Electrocardiogram (ECG) t  Patient does not need new test if normal ECG is present within the last six months and no change in clinical condition  Triggered by: Thyroid nodule         ? Thyroid function tests (TFTs) t  Consider Thyroid Function Tests if there is a change in condition  Triggered by: Thyroid nodule               Consultations       ? Primary Care Physician Evaluation   Primary care physician may need to evaluate patient prior to surgery  This is likely NOT necessary if the listed conditions are chronic and stable  Triggered by: Thyroid nodule               Miscellaneous Questions         Question: Are you able to walk up a flight of stairs, walk up a hill or do heavy housework WITHOUT having chest pain or shortness of breath? Answer: YES                   Allergies/Conditions/Medications Not Found        The following were not recognized by our system when generating the recommendations  Please consider if this would impact any preoperative protocols  ? Currently working       ? Social alcohol use       ? Amitriptyline HCl - 10 MG Oral Tablet       ? Excedrin Extra Strength TABS                  Appointment Information         Date:    08/31/2017        Location:    [place]        Address:           Directions:                      Footnotes revision 14      ?? Denotes a free-text entry        Legal Disclaimer: Any and all recommendations and services provided herein are designed to assist in the preoperative care of the patient  Nothing contained herein is designed to replace, eliminate or alleviate the responsibility of the attending physician to supervise and determine the patient?s preoperative care and course of treatment  Failure to provide complete, accurate information may negatively impact the system?s ability to recommend the proper preoperative protocol  THE ATTENDING PHYSICIAN IS RESPONSIBLE TO REVIEW THE SUGGESTED PREOPERATIVE PROTOCOLS/COURSE OF TREATMENT AND PRESCRIBE THE FINAL COURSE OF PREOPERATIVE TREATMENT IN CONSULTATION WITH THE PATIENT  THE ePREOP SYSTEM AND ITS MATERIALS ARE PROVIDED ? AS IS? WITHOUT WARRANTY OF ANY KIND, EXPRESS OR IMPLIED, INCLUDING, BUT NOT LIMITED TO, WARRANTIES OF PERFORMANCE OR MERCHANTABILITY OR FITNESS FOR A PARTICULAR PURPOSE  PATIENT AND PHYSICIANS HEREBY AGREE THAT THEIR USE OF THE MATERIALS AND RESOURCES ACT AS A CONSENT TO RELEASE AND WAIVE ePREOP FROM ANY AND ALL CLAIMS OF WARRANTY, TORT OR CONTRACT LAW OF ANY KIND             Electronically signed by:Roderick Donney Homans MD  Aug 14 2017  4:39PM EST

## 2018-01-13 NOTE — RESULT NOTES
Verified Results  CTA HEAD W 222 [employer] [id number] 07:45AM Kayley Monroy Order Number: [id number]    - Patient Instructions: To schedule this appointment, please contact Central Scheduling at [phone]  Test Name Result Flag Reference   CTA HEAD W WO CONTRAST (Report)     CTA BRAIN - WITH AND WITHOUT CONTRAST     INDICATION: D18 02: Hemangioma of intracranial structures  History taken directly from the electronic ordering system  COMPARISON:  CT head performed on 7/4/2017  MRI performed on 7/4/2017     TECHNIQUE: Routine noncontrast CT brain followed by axial 0 625 mm imaging after administration of intravenous contrast  MIP and 3D reconstructions performed  3D rendering was performed on an independent workstation  Radiation dose length product (DLP) for this visit: 1456 67 mGy-cm   This examination, like all CT scans performed in the [facility], was performed utilizing techniques to minimize radiation dose exposure, including the use of    iterative reconstruction and automated exposure control  IV Contrast: 85 mL of iohexol (OMNIPAQUE)      IMAGE QUALITY: Diagnostic  FINDINGS:     NONCONTRAST BRAIN: There is no acute intracranial hemorrhage, mass effect or midline shift  No extra-axial collection identified  Gray-white differentiation is maintained  Again noted is a focus of hyperdensity within the posterior left medial inferior frontal lobe in keeping with change from cavernous hemangioma  No evidence for surrounding edema to suggest more recent hemorrhage  Cerebral volume is within normal limits for age  No substantial white matter changes are seen  DISTAL INTERNAL CAROTID ARTERIES: Bilateral ICAs are patent with left-sided dominance but this is the product of the left ICA supplying the right TRINY territory  ANTERIOR CIRCULATION: Right A1 is aplastic  Left A1 is dominant with an anterior communicating artery   A2 and A3 segments are within normal limits of caliber  No signs of arterial flow in the area of question which would be consistent with the    presence of a cavernous hemangioma with associated developmental venous and normal      MIDDLE CEREBRAL ARTERY CIRCULATION: MCA vasculature patent bilaterally and within normal limits of caliber  Again no signs of arterial feeders to the aforementioned cavernous hemangioma  DISTAL VERTEBRAL ARTERIES: Normal distal vertebral arteries  Posterior inferior cerebellar artery origins are normal  Normal vertebral basilar junction  BASILAR ARTERY: Basilar artery is normal in caliber  Normal superior cerebellar arteries  POSTERIOR CEREBRAL ARTERIES: Both posterior cerebral arteries arises from the basilar tip  Both arteries demonstrate normal flow-related enhancement  Normal posterior communicating arteries  DURAL VENOUS SINUSES: Normal      BONY STRUCTURES: Unremarkable bony structures  IMPRESSION:     No acute intracranial abnormality  Stable left inferior frontal cavernous hemangioma without evidence for more recent hemorrhage  No evidence for vascular nidus or abnormality or arterial abnormality to account for aforementioned lesion (confirmatory of a cavernous hemangioma)  No signs of intracranial vascular occlusive disease or aneurysm formation          Workstation performed: [id number]     Signed by:   Cristina Sykes MD   7/24/17     ECG 12-LEAD [id number] 07:45AM Summerdalettkirill Mccormack     Test Name Result Flag Reference   ECG 12-LEAD      Normal sinus rhythm   Normal ECG   No previous ECGs available   Confirmed by AARON Townsend , Adrienne Denny ([phone]) on 7/24/2017 9:15:39 AM

## 2018-01-15 VITALS
WEIGHT: 231 LBS | DIASTOLIC BLOOD PRESSURE: 88 MMHG | BODY MASS INDEX: 45.35 KG/M2 | OXYGEN SATURATION: 99 % | HEIGHT: 60 IN | SYSTOLIC BLOOD PRESSURE: 128 MMHG | HEART RATE: 72 BPM

## 2018-01-16 NOTE — PROGRESS NOTES
Assessment    1  Encounter for preventive health examination (V70 0) (Z00 00)   2  Depression (311) (F32 9)   3  Elevated glucose (790 29) (R73 09)   4  Arthritis of left knee (716 96) (M17 12)   5  Allergic rhinitis (477 9) (J30 9)   6  Bilateral hearing loss (389 9) (H91 93)   7  Blood glucose elevated (790 29) (R73 9)   8  Umbilical hernia (259 3) (K42 9)   9  Chronic low back pain (724 2,338 29) (M54 5,G89 29)    Plan  Allergic rhinitis    · Fluticasone Propionate 50 MCG/ACT Nasal Suspension; USE 2 SPRAYS IN EACH  NOSTRIL ONCE DAILY    Discussion/Summary    Elevated glucose on blood chemistries: Plan is to repeat fasting glucose tomorrow with hemoglobin A1c and further advise  Family history melanoma in his father: Referred to advanced dermatology for long-term surveillance  Chronic arthritis of left knee status post left knee surgery for loose patella syndrome: Referral to [facility] orth[facility]  Chronic low back pain: Plan is to improve the arthritis symptoms, hopefully balancing his gait, and giving him the opportunity to exercise and lose weight which may help his back  Allergic rhinitis: Continue fluticasone nasal spray seasonally  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 and continue sertraline indefinitely  Next checkup was recommended for one year  Chief Complaint  yearly physical       History of Present Illness  HPI: Pranav Tidwell is a very nice 68-year-old gentleman who is here today for annual checkup  I last saw him in June of last year when he moved back to the area  He's been doing well overall but does have some new issues  Seasonal rhinitis: This tends to appear in the spring and fall, without lower respiratory symptoms, mainly nasal congestion and watery and itchy eyes  Over-the-counter fluticasone nasal sprays were quite well and will be continued      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 for him and he does not have depression on this medicine and will need this indefinitely  This will be renewed as needed  He is taking this medicine daily  Elevated glucose: His annual labs from April 3 were reviewed and included a fasting glucose of 143 with no symptoms of hyperglycemia, no previous history of elevated glucose, no family history of diabetes  Dresses blood chemistries were normal, and CBC was normal, other slightly elevated eosinophils consistent with seasonal allergies, with normal urinalysis as well  Arthritis of left knee: This is chronic and he had patellar realignment when in the [place] about 12 years ago  He has pain with activity and this limits his ability to exercise  He is occasional swelling of his knee as well and activity  There is no instability  Chronic low back pain: He sees a chiropractor once a month with history of lumbar disc disease without radiculopathy symptoms  This is an injury he sustained in the [employer]  He gets good relief from seeing the chiropractor  He has bilateral hearing aids and these are working well  He reports normal vision and he is up-to-date with dental care  His wife and children are doing well  Active Problems    1   Depression (311) (F32 9)    Past Medical History    · History of Obstructive sleep apnea (327 23) (G47 33)    Surgical History    · History of Arthroscopy Knee Left   · History of Cornea Excimer Laser Photorefractive Keratectomy Bilaterally   · History of Hernia Repair    Family History  Mother    · Family history of cerebrovascular accident (CVA) (V17 1) (Z82 3)   · Family history of depression (V17 0) (Z81 8)   · Family history of hypertension (V17 49) (Z82 49)  Father    · Family history of depression (V17 0) (Z81 8)   · Family history of malignant melanoma (V16 8) (Z80 8)   · Family history of Parkinson's disease (V17 2) (Z82 0)  Grandmother    · Family history of cerebrovascular accident (CVA) (V17 1) (Z82 3)   · Family history of depression (V17 0) (Z81 8)  Maternal Grandmother    · Family history of Bone cancer  Paternal Grandfather    · Family history of malignant neoplasm of prostate (E15 36) (Z80 42)    Social History    · Currently working   · Former smoker (M30 43) (O26 369)   · Social alcohol use (Z78 9)    Current Meds   1  Sertraline HCl - 100 MG Oral Tablet; Take 1 tablet daily; Therapy: 24KNX4962 to (Evaluate:04Mar2017); Last Rx:07Jun2016 Ordered    Allergies    1  No Known Drug Allergies    Vitals   Recorded: 07Apr2017 07:58AM   Heart Rate 92   Systolic 262, LUE, Sitting   Diastolic 82, LUE, Sitting   Height 6 ft    Weight 230 lb 6 oz   BMI Calculated 31 24   BSA Calculated 2 26   O2 Saturation 97     Physical Exam    Constitutional Very pleasant gentleman, vital signs stable and afebrile  Hearing is well compensated with hearing aids  ENT exam notable for clear nasal discharge and nasal membrane redness and mild swelling, clear postnasal drainage without airway compromise and evidence of allergic conjunctivitis of both eyes  External canals and tympanic membranes are normal  There is no head or neck mass or adenopathy  There is no JVD  Carotid pulses are normal  Pharynx exam is normal  Skin notable for no rash or malignancy  Lungs are clear cardiac exam is normal and auscultation  Abdomen: Small umbilical hernia which appears unchanged, normal bowel sounds, nontender and soft without masses bruits or organomegaly  There is a scar extending sagittally over the knee from the distal left anterior upper leg to the tibial plateau  There is no swelling of the joint, there is some grinding on passive range of motion of the left knee that seems to be in the anterior compartment  Circulation is intact   Mood is optimal       Signatures   Electronically signed by : AARON Jones ; Apr 7 2017  8:28AM EST                       (Author)

## 2018-01-22 VITALS
SYSTOLIC BLOOD PRESSURE: 128 MMHG | HEART RATE: 72 BPM | DIASTOLIC BLOOD PRESSURE: 76 MMHG | BODY MASS INDEX: 31.08 KG/M2 | WEIGHT: 229.5 LBS | RESPIRATION RATE: 16 BRPM | HEIGHT: 72 IN | TEMPERATURE: 97.9 F

## 2018-01-22 VITALS
HEIGHT: 72 IN | BODY MASS INDEX: 31.2 KG/M2 | DIASTOLIC BLOOD PRESSURE: 82 MMHG | WEIGHT: 230.38 LBS | HEART RATE: 92 BPM | SYSTOLIC BLOOD PRESSURE: 124 MMHG | OXYGEN SATURATION: 97 %

## 2018-01-23 NOTE — MISCELLANEOUS
Message   Recorded as Task   Date: 11/30/2017 10:10 AM, Created By: Kasandra Guardado   Task Name: Follow Up   [facility]   Regarding Patient: Troy Hansen, Status: Active   CommentBob Burk - 30 Nov 2017 10:10 AM     TASK CREATED  Spoke w/ pt post hemorroidectomy, ambulating ad rafy and is back to regular diet  Using pain meds and colace  No bleeding noted at this time  Advised to contact office w/ questions or concerns prior to post op ov  Is aware pathology pending  Kasandra Guardado - 06 Dec 2017 9:47 AM     TASK EDITED  Spoke w/ pt he is feeling better, pain meds are helping  Aware of pathology results adenoma  Will f/u at post op ov 12/12  Active Problems    1  Allergic rhinitis (477 9) (J30 9)   2  Arthritis of left knee (716 96) (M17 12)   3  Bilateral hearing loss (389 9) (H91 93)   4  Blood glucose elevated (790 29) (R73 9)   5  Cavernous hemangioma of brain (228 02) (D18 02)   6  Cervical spondylosis without myelopathy (721 0) (M47 812)   7  Chronic low back pain (724 2,338 29) (M54 5,G89 29)   8  Chronic pain of left knee (769 63,353 47) (M25 562,G89 29)   9  Concussion syndrome (310 2) (F07 81)   10  Depression (311) (F32 9)   11  Elevated glucose (790 29) (R73 09)   12  External hemorrhoids (455 3) (K64 4)   13  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)   14  Hemorrhage of anus and rectum (569 3) (K62 5)   15  Thyroid nodule (241 0) (E04 1)   16  Umbilical hernia (546 1) (K42 9)   17  Venous hemangioma (228 00) (D18 00)   18  Worsening headaches (784 0) (R51)    Current Meds   1  Butalbital-APAP-Caffeine -40 MG Oral Capsule; TAKE 1 CAPSULE EVERY 4   HOURS AS NEEDED; Therapy: 79EUJ2404 to (Evaluate:32Sbr1032); Last Rx:36Igg9172 Ordered   2  Excedrin Extra Strength TABS; TAKE 1 TABLET 3 TIMES DAILY AS NEEDED; Therapy: (Recorded:50Wai4793) to Recorded   3  Lidocaine HCl - 2 % External Gel; APPLY AS DIRECTED;    Therapy: 78BBQ2624 to (Last Rx:97Wsf2090)  Requested for: 15OBV6132 Ordered   4  Multi-Vitamin TABS; TAKE 1 TABLET DAILY; Therapy: (Recorded:10Oct2017) to Recorded   5  Sertraline HCl - 100 MG Oral Tablet; take 1 tablet by mouth every day; Therapy: 08JHT4447 to (Evaluate:26Jan2018)  Requested for: 53BXY4031; Last   SA:86IST6661 Ordered    Allergies    1  No Known Drug Allergies    2  No Known Environmental Allergies   3   No Known Food Allergies    Signatures   Electronically signed by : Celina Rahman, ; Dec  6 2017  9:47AM EST                       (Author)

## 2018-01-23 NOTE — MISCELLANEOUS
Message   Recorded as Task   Date: 12/07/2017 03:22 PM, Created By: Kevin Scott   Task Name: Call Back   Assigned To: [facility],Team   Regarding Patient: Bautista Nolasco, Status: Active   Lana Urias - 07 Dec 2017 3:22 PM     TASK CREATED  Please call with benign pathology  Hemorrhoid seen  Frederic Grimes - 08 Dec 2017 11:25 AM     TASK EDITED  Pt is aware of result  Active Problems    1  Allergic rhinitis (477 9) (J30 9)   2  Arthritis of left knee (716 96) (M17 12)   3  Bilateral hearing loss (389 9) (H91 93)   4  Blood glucose elevated (790 29) (R73 9)   5  Cavernous hemangioma of brain (228 02) (D18 02)   6  Cervical spondylosis without myelopathy (721 0) (M47 812)   7  Chronic low back pain (724 2,338 29) (M54 5,G89 29)   8  Chronic pain of left knee (063 59,002 77) (M25 562,G89 29)   9  Concussion syndrome (310 2) (F07 81)   10  Depression (311) (F32 9)   11  Elevated glucose (790 29) (R73 09)   12  External hemorrhoids (455 3) (K64 4)   13  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)   14  Hemorrhage of anus and rectum (569 3) (K62 5)   15  Thyroid nodule (241 0) (E04 1)   16  Umbilical hernia (012 6) (K42 9)   17  Venous hemangioma (228 00) (D18 00)   18  Worsening headaches (784 0) (R51)    Current Meds   1  Butalbital-APAP-Caffeine -40 MG Oral Capsule; TAKE 1 CAPSULE EVERY 4   HOURS AS NEEDED; Therapy: 27CFE5027 to (Evaluate:73Xbf5925); Last Rx:80Clv7731 Ordered   2  Excedrin Extra Strength TABS; TAKE 1 TABLET 3 TIMES DAILY AS NEEDED; Therapy: (Recorded:99Wip8587) to Recorded   3  Lidocaine HCl - 2 % External Gel; APPLY AS DIRECTED; Therapy: 56OOG9583 to (Last Rx:17Nov2017)  Requested for: 51YNV5359 Ordered   4  Multi-Vitamin TABS; TAKE 1 TABLET DAILY; Therapy: (Recorded:10Oct2017) to Recorded   5  Sertraline HCl - 100 MG Oral Tablet; take 1 tablet by mouth every day;    Therapy: 06FVD7504 to (HQUGZGPB:81OYF1886)  Requested for: 08GPJ7068; Last HW:66JOV2601 Ordered    Allergies    1  No Known Drug Allergies    2  No Known Environmental Allergies   3   No Known Food Allergies    Signatures   Electronically signed by : Deon Abarca, ; Dec  8 2017 11:26AM EST                       (Author)

## 2018-03-10 ENCOUNTER — APPOINTMENT (OUTPATIENT)
Dept: LAB | Facility: MEDICAL CENTER | Age: 43
End: 2018-03-10
Payer: COMMERCIAL

## 2018-03-10 DIAGNOSIS — D49.7 NEOPLASM OF UNSPECIFIED BEHAVIOR OF ENDOCRINE GLANDS AND OTHER PARTS OF NERVOUS SYSTEM: ICD-10-CM

## 2018-03-10 LAB
T3 SERPL-MCNC: 0.7 NG/ML (ref 0.6–1.8)
T4 FREE SERPL-MCNC: 0.83 NG/DL (ref 0.76–1.46)
TSH SERPL DL<=0.05 MIU/L-ACNC: 2.45 UIU/ML (ref 0.36–3.74)

## 2018-03-10 PROCEDURE — 36415 COLL VENOUS BLD VENIPUNCTURE: CPT

## 2018-03-10 PROCEDURE — 84480 ASSAY TRIIODOTHYRONINE (T3): CPT

## 2018-03-10 PROCEDURE — 84443 ASSAY THYROID STIM HORMONE: CPT

## 2018-03-10 PROCEDURE — 84439 ASSAY OF FREE THYROXINE: CPT

## 2018-03-12 PROBLEM — K42.9 UMBILICAL HERNIA: Status: ACTIVE | Noted: 2017-04-07

## 2018-03-12 PROBLEM — R73.9 BLOOD GLUCOSE ELEVATED: Status: ACTIVE | Noted: 2017-04-07

## 2018-03-12 PROBLEM — M25.562 CHRONIC PAIN OF LEFT KNEE: Status: ACTIVE | Noted: 2017-04-26

## 2018-03-12 PROBLEM — J30.9 ALLERGIC RHINITIS: Status: ACTIVE | Noted: 2017-04-07

## 2018-03-12 PROBLEM — G89.29 CHRONIC PAIN OF LEFT KNEE: Status: ACTIVE | Noted: 2017-04-26

## 2018-03-12 PROBLEM — G89.29 CHRONIC LOW BACK PAIN: Status: ACTIVE | Noted: 2017-04-07

## 2018-03-12 PROBLEM — R73.09 ELEVATED GLUCOSE: Status: ACTIVE | Noted: 2017-04-07

## 2018-03-12 PROBLEM — D18.02 CAVERNOUS HEMANGIOMA OF BRAIN (HCC): Status: ACTIVE | Noted: 2017-07-05

## 2018-03-12 PROBLEM — D49.7 FOLLICULAR NEOPLASM OF THYROID: Status: ACTIVE | Noted: 2017-07-26

## 2018-03-12 PROBLEM — D18.00: Status: ACTIVE | Noted: 2017-07-05

## 2018-03-12 PROBLEM — E04.1 THYROID NODULE: Status: ACTIVE | Noted: 2017-07-05

## 2018-03-12 PROBLEM — M17.12 ARTHRITIS OF LEFT KNEE: Status: ACTIVE | Noted: 2017-04-07

## 2018-03-12 PROBLEM — M54.50 CHRONIC LOW BACK PAIN: Status: ACTIVE | Noted: 2017-04-07

## 2018-03-12 PROBLEM — F07.81 CONCUSSION SYNDROME: Status: ACTIVE | Noted: 2017-07-05

## 2018-03-12 PROBLEM — M47.812 CERVICAL SPONDYLOSIS WITHOUT MYELOPATHY: Status: ACTIVE | Noted: 2017-07-06

## 2018-03-12 PROBLEM — H91.93 BILATERAL HEARING LOSS: Status: ACTIVE | Noted: 2017-04-07

## 2018-03-12 PROBLEM — R51.9 WORSENING HEADACHES: Status: ACTIVE | Noted: 2017-07-06

## 2018-03-12 PROBLEM — K62.5 HEMORRHAGE OF ANUS AND RECTUM: Status: ACTIVE | Noted: 2017-11-16

## 2018-03-12 RX ORDER — MULTIVITAMIN
1 TABLET ORAL DAILY
COMMUNITY

## 2018-03-12 RX ORDER — ACETAMINOPHEN, ASPIRIN AND CAFFEINE 250; 250; 65 MG/1; MG/1; MG/1
1 TABLET, FILM COATED ORAL 3 TIMES DAILY PRN
COMMUNITY
End: 2018-04-20 | Stop reason: ALTCHOICE

## 2018-03-15 DIAGNOSIS — D49.7 NEOPLASM OF UNSPECIFIED BEHAVIOR OF ENDOCRINE GLANDS AND OTHER PARTS OF NERVOUS SYSTEM: ICD-10-CM

## 2018-03-16 ENCOUNTER — OFFICE VISIT (OUTPATIENT)
Dept: SURGICAL ONCOLOGY | Facility: CLINIC | Age: 43
End: 2018-03-16
Payer: COMMERCIAL

## 2018-03-16 VITALS
SYSTOLIC BLOOD PRESSURE: 118 MMHG | TEMPERATURE: 97.4 F | WEIGHT: 237.4 LBS | HEART RATE: 80 BPM | BODY MASS INDEX: 32.2 KG/M2 | DIASTOLIC BLOOD PRESSURE: 80 MMHG

## 2018-03-16 DIAGNOSIS — D49.7 FOLLICULAR NEOPLASM OF THYROID: Primary | ICD-10-CM

## 2018-03-16 PROCEDURE — 99213 OFFICE O/P EST LOW 20 MIN: CPT | Performed by: SURGERY

## 2018-03-16 NOTE — LETTER
March 16, 2018     Diomedes Saenz MD  [address]  [address] [facility]    Patient: Terrell Holcomb   YOB: 1975   Date of Visit: 3/16/2018       Dear Dr Lex Combs: Thank you for referring Terrell Holcomb to me for evaluation  Below are my notes for this consultation  If you have questions, please do not hesitate to call me  I look forward to following your patient along with you  Sincerely,        Edgar Degroot MD        CC: MD Edgar Jacobo MD  3/16/2018  3:46 PM  Sign at close encounter     Surgical Oncology Follow Up       [address]  1975  [id number]  [facility]    No chief complaint on file  Assessment/Plan:    No problem-specific Assessment & Plan notes found for this encounter  There are no diagnoses linked to this encounter  Advance Care Planning/Advance Directives:  Discussed disease status, cancer treatment plans and/or cancer treatment goals with the patient  No history exists  History of Present Illness:  Patient is here 6 months post hemithyroidectomy for what turned out to be a benign nodule  He had a thyroid function panel done in anticipation of today's visit     -Interval History:  He has had no endocrine issues since his surgery 6 months ago  Review of Systems:  Review of Systems   Constitutional: Negative  HENT: Negative  Eyes: Negative  Respiratory: Negative  Cardiovascular: Negative  Gastrointestinal: Negative  Endocrine: Negative  Genitourinary: Negative  Musculoskeletal: Negative  Skin: Negative  Allergic/Immunologic: Negative  Neurological: Negative  Hematological: Negative  Psychiatric/Behavioral: Negative          Patient Active Problem List   Diagnosis  External hemorrhoids    Allergic rhinitis    Bilateral hearing loss    Blood glucose elevated    Arthritis of left knee    Cavernous hemangioma of brain (HCC)    Cervical spondylosis without myelopathy    Chronic low back pain    Chronic pain of left knee    Concussion syndrome    Depression    Elevated glucose    Follicular neoplasm of thyroid    Hemorrhage of anus and rectum    Thyroid nodule    Umbilical hernia    Venous hemangioma    Worsening headaches     Past Medical History:   Diagnosis Date    Arthritis     Cancer ([address] )     thyroid    Depression     Disease of thyroid gland     Headache     migraines    Hearing aid worn     bilat    Hemorrhoid     Sleep apnea     had surgery for it    Tinnitus     Wears contact lenses     Wears glasses      Past Surgical History:   Procedure Laterality Date    HEMORRHOID SURGERY N/A 11/29/2017    Procedure: HEMORRHOIDECTOMY EXCISION EXTERNAL AND INTERNAL;  Surgeon: Nadege Flores MD;  Location: [facility];  Service: General    HERNIA REPAIR      KNEE SURGERY Left     IL THYROID LOBECTOMY,UNILAT Right 8/31/2017    Procedure: HEMITHYROIDECTOMY;  Surgeon: Edwina Jules MD;  Location:  MAIN OR;  Service: Surgical Oncology    REFRACTIVE SURGERY      TONSILLECTOMY      UVULOPALATOPHARYNGOPLASTY       No family history on file  Social History     Social History    Marital status: /Civil Union     Spouse name: N/A    Number of children: N/A    Years of education: N/A     Occupational History    Not on file       Social History Main Topics    Smoking status: Former Smoker     Packs/day: 0 25     Years: 10 00     Types: Cigarettes     Quit date: 8/31/2014    Smokeless tobacco: Never Used    Alcohol use Yes      Comment: beer - 'few time' a week    Drug use: No    Sexual activity: Not on file     Other Topics Concern    Not on file     Social History Narrative    No narrative on file       Current Outpatient Prescriptions:     aspirin-acetaminophen-caffeine (EXCEDRIN MIGRAINE) 250-250-65 MG per tablet, Take 1 tablet by mouth 3 (three) times a day as needed, Disp: , Rfl:     butalbital-acetaminophen-caffeine (FIORICET,ESGIC) -40 mg per tablet, Take 1 tablet by mouth every 4 (four) hours as needed for migraine  , Disp: , Rfl:     HYDROcodone-acetaminophen (NORCO) 5-325 mg per tablet, Take 1 tablet by mouth every 4 (four) hours as needed for pain for up to 10 doses Max Daily Amount: 6 tablets, Disp: 10 tablet, Rfl: 0    lidocaine (XYLOCAINE) 2 % topical gel, Apply 1 application topically as needed for mild pain, Disp: 30 mL, Rfl: 2    lidocaine (XYLOCAINE) 2 % topical gel, Apply topically, Disp: , Rfl:     Multiple Vitamin (MULTI-VITAMIN DAILY) TABS, Take 1 tablet by mouth daily, Disp: , Rfl:     Multiple Vitamin (MULTIVITAMIN) tablet, Take 1 tablet by mouth daily, Disp: , Rfl:     sertraline (ZOLOFT) 100 mg tablet, Take 100 mg by mouth daily at bedtime  , Disp: , Rfl:   No Known Allergies  Vitals:    03/16/18 1535   BP: 118/80   Pulse: 80   Temp: (!) 97 4 °F (36 3 °C)       Physical Exam   Constitutional: He is oriented to person, place, and time  He appears well-developed and well-nourished  HENT:   Head: Normocephalic and atraumatic  Eyes: EOM are normal  Pupils are equal, round, and reactive to light  Neck: Normal range of motion  Neck supple  No thyromegaly present  Cardiovascular: Normal rate, regular rhythm and normal heart sounds  Pulmonary/Chest: Effort normal and breath sounds normal    Abdominal: Soft  Bowel sounds are normal    Musculoskeletal: Normal range of motion  Lymphadenopathy:     He has no cervical adenopathy  Neurological: He is alert and oriented to person, place, and time  Skin: Skin is warm and dry  Psychiatric: He has a normal mood and affect   His behavior is normal  Judgment and thought content normal          Results:  Labs:   Ref Range & Units 3/10/18  9:00 AM T3, Total 0 60 - 1 80 ng/mL 0 70       Specimen Collected: 03/10/18  9:00 AM   Last Resulted: 03/10/18 11:52 AM         Ref Range & Units 3/10/18  9:00 AM   Free T4 0 76 - 1 46 ng/dL 0 83       Ref Range & Units 3/10/18  9:00 AM   TSH 3RD GENERATON 0 358 - 3 740 uIU/mL          Imaging  No results found  I reviewed the above laboratory and imaging data  Discussion/Summary:  6 months post hemithyroidectomy  Thyroid remnant function is within normal limits at this point  I therefore recommended that he continue routine follow-up with his primary doctor  Thyroid function panel can be checked in the future, and medication started as needed if needed

## 2018-03-16 NOTE — PROGRESS NOTES
Surgical Oncology Follow Up       [address] SURGICAL [facility]COLOGY [address] [facility] [address]    Millie Dan  1975  [id number]  [facility] SURGICAL ONCOLOGY [place]  [address] [address]    No chief complaint on file  Assessment/Plan:    No problem-specific Assessment & Plan notes found for this encounter  There are no diagnoses linked to this encounter  Advance Care Planning/Advance Directives:  Discussed disease status, cancer treatment plans and/or cancer treatment goals with the patient  No history exists  History of Present Illness:  Patient is here 6 months post hemithyroidectomy for what turned out to be a benign nodule  He had a thyroid function panel done in anticipation of today's visit     -Interval History:  He has had no endocrine issues since his surgery 6 months ago  Review of Systems:  Review of Systems   Constitutional: Negative  HENT: Negative  Eyes: Negative  Respiratory: Negative  Cardiovascular: Negative  Gastrointestinal: Negative  Endocrine: Negative  Genitourinary: Negative  Musculoskeletal: Negative  Skin: Negative  Allergic/Immunologic: Negative  Neurological: Negative  Hematological: Negative  Psychiatric/Behavioral: Negative          Patient Active Problem List   Diagnosis    External hemorrhoids    Allergic rhinitis    Bilateral hearing loss    Blood glucose elevated    Arthritis of left knee    Cavernous hemangioma of brain (HCC)    Cervical spondylosis without myelopathy    Chronic low back pain    Chronic pain of left knee    Concussion syndrome    Depression    Elevated glucose    Follicular neoplasm of thyroid    Hemorrhage of anus and rectum    Thyroid nodule    Umbilical hernia    Venous hemangioma    Worsening headaches     Past Medical History:   Diagnosis Date    Arthritis     Cancer ([facility] [address] ) thyroid    Depression     Disease of thyroid gland     Headache     migraines    Hearing aid worn     bilat    Hemorrhoid     Sleep apnea     had surgery for it    Tinnitus     Wears contact lenses     Wears glasses      Past Surgical History:   Procedure Laterality Date    HEMORRHOID SURGERY N/A 11/29/2017    Procedure: HEMORRHOIDECTOMY EXCISION EXTERNAL AND INTERNAL;  Surgeon: Sidra An MD;  [facility];  Service: General    HERNIA REPAIR      KNEE SURGERY Left     [facility] THYROID LOBECTOMY,UNILAT Right 8/31/2017    Procedure: HEMITHYROIDECTOMY;  Surgeon: Rafael Fleischer, MD;  [facility];  Service: Surgical Oncology    REFRACTIVE SURGERY      TONSILLECTOMY      UVULOPALATOPHARYNGOPLASTY       No family history on file  Social History     Social History    Marital status: /Civil Union     Spouse name: N/A    Number of children: N/A    Years of education: N/A     Occupational History    Not on file       Social History Main Topics    Smoking status: Former Smoker     Packs/day: 0 25     Years: 10 00     Types: Cigarettes     Quit date: 8/31/2014    Smokeless tobacco: Never Used    Alcohol use Yes      Comment: beer - 'few time' a week    Drug use: No    Sexual activity: Not on file     Other Topics Concern    Not on file     Social History Narrative    No narrative on file       Current Outpatient Prescriptions:     aspirin-acetaminophen-caffeine (EXCEDRIN MIGRAINE) 250-250-65 MG per tablet, Take 1 tablet by mouth 3 (three) times a day as needed, Disp: , Rfl:     butalbital-acetaminophen-caffeine (FIORICET,ESGIC) -40 mg per tablet, Take 1 tablet by mouth every 4 (four) hours as needed for migraine  , Disp: , Rfl:     HYDROcodone-acetaminophen (NORCO) 5-325 mg per tablet, Take 1 tablet by mouth every 4 (four) hours as needed for pain for up to 10 doses Max Daily Amount: 6 tablets, Disp: 10 tablet, Rfl: 0    lidocaine (XYLOCAINE) 2 % topical gel, Apply 1 application topically as needed for mild pain, Disp: 30 mL, Rfl: 2    lidocaine (XYLOCAINE) 2 % topical gel, Apply topically, Disp: , Rfl:     Multiple Vitamin (MULTI-VITAMIN DAILY) TABS, Take 1 tablet by mouth daily, Disp: , Rfl:     Multiple Vitamin (MULTIVITAMIN) tablet, Take 1 tablet by mouth daily, Disp: , Rfl:     sertraline (ZOLOFT) 100 mg tablet, Take 100 mg by mouth daily at bedtime  , Disp: , Rfl:   No Known Allergies  Vitals:    03/16/18 1535   BP: 118/80   Pulse: 80   Temp: (!) 97 4 °F (36 3 °C)       Physical Exam   Constitutional: He is oriented to person, place, and time  He appears well-developed and well-nourished  HENT:   Head: Normocephalic and atraumatic  Eyes: EOM are normal  Pupils are equal, round, and reactive to light  Neck: Normal range of motion  Neck supple  No thyromegaly present  Cardiovascular: Normal rate, regular rhythm and normal heart sounds  Pulmonary/Chest: Effort normal and breath sounds normal    Abdominal: Soft  Bowel sounds are normal    Musculoskeletal: Normal range of motion  Lymphadenopathy:     He has no cervical adenopathy  Neurological: He is alert and oriented to person, place, and time  Skin: Skin is warm and dry  Psychiatric: He has a normal mood and affect  His behavior is normal  Judgment and thought content normal          Results:  Labs:   Ref Range & Units 3/10/18  9:00 AM   T3, Total 0 60 - 1 80 ng/mL 0 70       Specimen Collected: 03/10/18  9:00 AM   Last Resulted: 03/10/18 11:52 AM         Ref Range & Units 3/10/18  9:00 AM   Free T4 0 76 - 1 46 ng/dL 0 83       Ref Range & Units 3/10/18  9:00 AM   TSH 3RD GENERATON 0 358 - 3 740 uIU/mL          Imaging  No results found  I reviewed the above laboratory and imaging data  Discussion/Summary:  6 months post hemithyroidectomy  Thyroid remnant function is within normal limits at this point  I therefore recommended that he continue routine follow-up with his primary doctor  Thyroid function panel can be checked in the future, and medication started as needed if needed

## 2018-04-20 ENCOUNTER — OFFICE VISIT (OUTPATIENT)
Dept: INTERNAL MEDICINE CLINIC | Facility: CLINIC | Age: 43
End: 2018-04-20
Payer: COMMERCIAL

## 2018-04-20 VITALS
TEMPERATURE: 97.4 F | DIASTOLIC BLOOD PRESSURE: 70 MMHG | RESPIRATION RATE: 18 BRPM | OXYGEN SATURATION: 98 % | HEIGHT: 72 IN | BODY MASS INDEX: 32.06 KG/M2 | WEIGHT: 236.7 LBS | SYSTOLIC BLOOD PRESSURE: 109 MMHG | HEART RATE: 83 BPM

## 2018-04-20 DIAGNOSIS — Z86.69 HISTORY OF OBSTRUCTIVE SLEEP APNEA: ICD-10-CM

## 2018-04-20 DIAGNOSIS — Z00.00 WELL ADULT EXAM: Primary | ICD-10-CM

## 2018-04-20 PROBLEM — D18.00: Status: RESOLVED | Noted: 2017-07-05 | Resolved: 2018-04-20

## 2018-04-20 PROBLEM — R51.9 WORSENING HEADACHES: Status: RESOLVED | Noted: 2017-07-06 | Resolved: 2018-04-20

## 2018-04-20 PROBLEM — E04.1 THYROID NODULE: Status: RESOLVED | Noted: 2017-07-05 | Resolved: 2018-04-20

## 2018-04-20 PROBLEM — R73.09 ELEVATED GLUCOSE: Status: RESOLVED | Noted: 2017-04-07 | Resolved: 2018-04-20

## 2018-04-20 PROBLEM — F07.81 CONCUSSION SYNDROME: Status: RESOLVED | Noted: 2017-07-05 | Resolved: 2018-04-20

## 2018-04-20 PROBLEM — R73.9 BLOOD GLUCOSE ELEVATED: Status: RESOLVED | Noted: 2017-04-07 | Resolved: 2018-04-20

## 2018-04-20 PROBLEM — G89.29 CHRONIC PAIN OF LEFT KNEE: Status: RESOLVED | Noted: 2017-04-26 | Resolved: 2018-04-20

## 2018-04-20 PROBLEM — M25.562 CHRONIC PAIN OF LEFT KNEE: Status: RESOLVED | Noted: 2017-04-26 | Resolved: 2018-04-20

## 2018-04-20 PROBLEM — K62.5 HEMORRHAGE OF ANUS AND RECTUM: Status: RESOLVED | Noted: 2017-11-16 | Resolved: 2018-04-20

## 2018-04-20 PROCEDURE — 99396 PREV VISIT EST AGE 40-64: CPT | Performed by: INTERNAL MEDICINE

## 2018-04-20 NOTE — PROGRESS NOTES
Assessment/Plan   Problem List Items Addressed This Visit     None      Visit Diagnoses     Well adult exam    -  Primary      Preventive examination:  Sarah Beltrán will continue annual follow-up with Dermatology for history of melanoma in his father  At this time, he also addressed the issue of skin tags of left eyelid and the left axillary region  He will continue annual follow-up with his neurosurgeon including planned imaging the summer for cavernous sinus hemangioma  He remains asymptomatic  We discussed lifestyle improvement through increased exercise and calorie restriction to lose weight  We reviewed his lab work over the past year and he does not need repeat lab work at this time  We discussed his medical conditions  He clearly has symptoms of recurrence sleep apnea status post uvulopalatoplasty several years ago with initial good response but clear daytime somnolence, and feeling very fatigued and tired after a full night's sleep and his wife is noticing snoring and apneic spells  He was referred to [address] [place] [address] for consideration of evaluation for sleep apnea  His next examination can be in 1 year  Subjective   Patient ID: Zohra Frye is a 38 y o  male  This is a well visit  Vitals:    04/20/18 0738   BP: 109/70   Pulse: 83   Resp: 18   Temp: (!) 97 4 °F (36 3 °C)   SpO2: 98%     HPI   Sarah Beltrán continues to enjoy working full-time, long hours as a   He walks about 2 miles a day  He has good exercise tolerance  He made a complete recovery after hemorrhoid surgery late last year, and has a small umbilical hernia and plan is observation and we reviewed the surgeon's note  He has no symptoms from this hernia on review  Also, he had a thyroid nodule removed which proved to be benign and was released from active follow-up    He is followed actively by Neurosurgery for incidental finding of cavernous sinus hemangioma last year as part of trauma evaluation  He recovered from his head injury, and needs surveillance and has a follow-up visit with CT scan prior to this visit, with Neurosurgery later this summer  He is also seeing annually by Dermatology as his father has a history of melanoma  The patient has not had melanoma skin cancer  He does protect his skin from sun exposure  We reviewed his lab work from last year and he does not need additional lab work  He has a new complaint of recurrence sleep apnea symptoms and he is well coapted with them as he had a uvulopalatoplasty several years ago for severe obstructive sleep apnea and felt better until about 6 months ago  This seems associated with some weight gain, and he is noticing classic daytime somnolence and general fatigue, not waking up feeling rested and in fact feeling less rested the longer he sleeps and his wife is noticing snoring and apneic spells  Past medical history, family history, social history, allergies and medicines were reviewed  Family history is unchanged and include father with advanced Parkinson's and history of melanoma  Endogenous depression is well controlled  Pain is relatively minimal from his cervical arthritis and arthritis of the knees  Again, we discussed low-impact exercise and weight reduction to further improve prognosis regarding symptoms  The following portions of the patient's history were reviewed and updated as appropriate: allergies, current medications, past family history, past medical history, past social history, past surgical history and problem list     Review of Systems as above, all others negative  Objective   Physical Exam   Vital signs stable with regular pulse  Normal vision with glasses on gross testing  Hearing grossly normal   He is in good spirits and appears mildly fatigued  He is however, awake and alert  There are no gross neurologic abnormalities    HEENT exam notable for postsurgical changes of the oropharynx consistent with uvulopalatoplasty airways are patent  Head neck without mass or adenopathy  There is a well-healed scar over the thyroid with no thyroid mass appreciated or tenderness or regional adenopathy  Trachea is midline without stridor  There is no supraclavicular adenopathy  Lungs are clear throughout  Cardiac exam normal on auscultation  Abdomen:  Nondistended with normal bowel sounds, small reducible umbilical hernia, soft and nontender without masses bruits organomegaly  There is no edema and circulation is intact  There is no clubbing or cyanosis  Joint function is overall preserved with some limitation of motion of the neck    Gait is stable normal

## 2018-04-20 NOTE — PATIENT INSTRUCTIONS
Please call the office if you do not hear from the sleep specialist in the next 2 to 3 days  Please work on more regular exercise and reducing calories, especially carbohydrates and lose weight  Please call between annual visits for any questions or problems

## 2018-06-22 ENCOUNTER — OFFICE VISIT (OUTPATIENT)
Dept: PULMONOLOGY | Facility: CLINIC | Age: 43
End: 2018-06-22
Payer: COMMERCIAL

## 2018-06-22 VITALS
HEIGHT: 72 IN | BODY MASS INDEX: 31.8 KG/M2 | SYSTOLIC BLOOD PRESSURE: 114 MMHG | DIASTOLIC BLOOD PRESSURE: 78 MMHG | HEART RATE: 73 BPM | OXYGEN SATURATION: 98 % | WEIGHT: 234.8 LBS | TEMPERATURE: 97.1 F

## 2018-06-22 DIAGNOSIS — Z86.69 HISTORY OF OBSTRUCTIVE SLEEP APNEA: ICD-10-CM

## 2018-06-22 DIAGNOSIS — Z98.890 S/P UPPP (UVULOPALATOPHARYNGOPLASTY): Primary | ICD-10-CM

## 2018-06-22 PROCEDURE — 99204 OFFICE O/P NEW MOD 45 MIN: CPT | Performed by: INTERNAL MEDICINE

## 2018-06-22 NOTE — PATIENT INSTRUCTIONS
Sleep Apnea   AMBULATORY CARE:   Sleep apnea  is also called obstructive sleep apnea  It is a condition that causes you to stop breathing for 10 seconds or more while you are sleeping  During normal sleep, your throat is kept open by muscles that let air pass through easily  Sleep apnea causes the muscles and tissues around your throat to relax and block air from passing through  Sleep apnea may happen many times while you are asleep  Common symptoms include the following:   · No signs of breathing for 10 seconds or more while you sleep    · Snoring loudly, snorting, gasping or choking while you sleep, and waking up suddenly because of these    · A hard time thinking, remembering things, or focusing on your tasks the following day    · Headache or nausea    · Bedwetting or waking up often during the night to urinate    · Feeling sleepy, slow, and tired during the day  Seek care immediately if:   · You have chest pain or trouble breathing  Contact your healthcare provider if:   · You feel tired or depressed  · You have trouble staying awake during the day  · You have trouble thinking clearly  · You have questions or concerns about your condition or care  Treatment for sleep apnea  includes using a continuous positive airway pressure (CPAP) machine to keep your airway open during sleep  A mask is placed over your nose and mouth, or just your nose  The mask is hooked to the CPAP machine that blows a gentle stream of air into the mask when you breathe  This helps keep your airway open so you can breathe more regularly  Extra oxygen may be given to you through the machine  You may be given a mouth device  It looks like a mouth guard or dental retainer and stops your tongue and mouth tissues from blocking your throat while you sleep  Surgery may be needed to remove extra tissues that block your mouth, throat, or nose  Manage sleep apnea:   · Do not smoke    Nicotine and other chemicals in cigarettes and cigars can cause lung damage  Ask your healthcare provider for information if you currently smoke and need help to quit  E-cigarettes or smokeless tobacco still contain nicotine  Talk to your healthcare provider before you use these products  · Do not drink alcohol or take sedative medicine before you go to sleep  Alcohol and sedatives can relax the muscles and tissues around your throat  This can block the airflow to your lungs  · Maintain a healthy weight  Excess tissue around your throat may restrict your breathing  Ask your healthcare provider for information if you need to lose weight  · Sleep on your side or use pillows designed to prevent sleep apnea  This prevents your tongue or other tissues from blocking your throat  You can also raise the head of your bed  Follow up with your healthcare provider as directed:  Write down your questions so you remember to ask them during your visits  © 2017 [employer] Information is for End User's use only and may not be sold, redistributed or otherwise used for commercial purposes  All illustrations and images included in CareNotes® are the copyrighted property of A [employer] M [employer]  or [employer]  The above information is an  only  It is not intended as medical advice for individual conditions or treatments  Talk to your doctor, nurse or pharmacist before following any medical regimen to see if it is safe and effective for you

## 2018-06-22 NOTE — LETTER
June 22, 2018     Ray Cole MD  [address]  [address] [facility]    Patient: Janette Diego   YOB: 1975   Date of Visit: 6/22/2018       Dear Dr Flori Connor: Thank you for referring Janette Diego to me for evaluation  Below are my notes for this consultation  If you have questions, please do not hesitate to call me  I look forward to following your patient along with you  Sincerely,        Aziza Roque DO        CC: No Recipients  Aziza Roque DO  6/22/2018 11:04 AM  Sign at close encounter  Sleep Consultation   Janette Diego 38 y o  male MRN: [id number]      Reason for consultation: Obstructive sleep apnea    Requesting physician: Dr Flori Connor    Assessment/Plan  38 y o  y/o M with PMHx of Depression, migraines and Severe KRISTIE s/p UPPP who comes in for evaluation of daytime sleepiness and snoring  1  Severe KRISTIE s/p UPPP surgery - now with recurrence of snoring and daytime sleepiness  Body mass index is 31 84 kg/m²  ,         -  Check a home sleep study to assess for obstructive sleep apnea  Then will move forward with AutoPAP 5-20  -  I discussed in depth the risk of leaving sleep apnea untreated including hypertension, heart failure, arrhythmia, MI and stroke  -  The patient is agreeable to undergo testing and treatment of obstructive sleep apnea  He understands that pitfalls she may encounter along the way and is willing to attempt CPAP treatment  History of Present Illness   HPI:  Janette Diego is a 38 y o  male with PMHx as below who comes in for evaluation of severe obstructive sleep apnea  Mr J Carlos Ag was diagnosed with obstructive sleep apnea and was treated with CPAP  He decided then to be treated with UPPP procedure in hope to get resolution of his obstructive sleep apnea  He even underwent another sleep study to ensure it was resolved    However, over the past few months to year he noted 15 pound weight gain, snoring, excessive daytime sleepiness with an Conway score of 10,and awakenings with dry mouth  he also admits to teeth grinding but he denies symptoms of restless legs, cataplexy, sleep paralysis, hypnopompic or hypnagogic hallucinations  Sleep History:  he goes to bed at approximately 10pm, will get to sleep in 5  min, will get out of bed at 5:45 am   he states that he does not get up at night    he does not nap during he day  ROS:   Review of Systems   Constitutional: Positive for fatigue and unexpected weight change  HENT: Negative  Eyes: Negative  Respiratory: Negative  Cardiovascular: Negative  Gastrointestinal: Negative  Endocrine: Negative  Genitourinary: Negative  Musculoskeletal: Negative  Skin: Negative  Allergic/Immunologic: Negative  Neurological: Positive for headaches  Negative for dizziness, tremors and numbness  Hematological: Negative for adenopathy  Does not bruise/bleed easily  Psychiatric/Behavioral: Positive for dysphoric mood       Historical Information   Past Medical History:   Diagnosis Date    Arthritis     Cancer ([facility] [address] )     thyroid    Depression     Disease of thyroid gland     External hemorrhoids     last assessed 12/12/17    Headache     migraines    Hearing aid worn     bilat    Hemorrhoid     Obstructive sleep apnea     last assessed 6/7/16    Sleep apnea     had surgery for it    Tinnitus     Wears contact lenses     Wears glasses      Past Surgical History:   Procedure Laterality Date    ADENOIDECTOMY      HEMORRHOID SURGERY N/A 11/29/2017    Procedure: HEMORRHOIDECTOMY EXCISION EXTERNAL AND INTERNAL;  Surgeon: Nav Porter MD;  [facility];  Service: General    HERNIA REPAIR      KNEE SURGERY Left     [facility] THYROID LOBECTOMY,UNILAT Right 8/31/2017    Procedure: HEMITHYROIDECTOMY;  Surgeon: Yoko Ackerman MD;  Location:  MAIN OR;  Service: Surgical Oncology    [address] [place] [address] sleep apnea    TONSILLECTOMY      UVULOPALATOPHARYNGOPLASTY       Family History   Problem Relation Age of Onset   Isa Khalil Stroke Mother     Depression Mother     Hypertension Mother     Depression Father     Melanoma Father     Parkinsonism Father     Bone cancer Maternal Grandmother     Prostate cancer Paternal Grandfather     Stroke Other     Depression Other      Social History     Social History    Marital status: /Civil Union     Spouse name: N/A    Number of children: 2    Years of education: achelors degree     Occupational History    currently working      fulltime     Social History Main Topics    Smoking status: Former Smoker     Packs/day: 0 25     Years: 10 00     Types: Cigarettes     Quit date: 8/31/2014    Smokeless tobacco: Former User    Alcohol use 1 2 oz/week     2 Cans of beer per week      Comment: beer - 'few time' a week    Drug use: No    Sexual activity: Not on file     Other Topics Concern    Not on file     Social History Narrative    No narrative on file       Occupational History: Distribution management    Meds/Allergies   No Known Allergies    Home medications:  Prior to Admission medications    Medication Sig Start Date End Date Taking? Authorizing Provider   butalbital-acetaminophen-caffeine (FIORICET,ESGIC) -40 mg per tablet Take 1 tablet by mouth every 4 (four) hours as needed for migraine     Yes Historical Provider, MD   Multiple Vitamin (MULTI-VITAMIN DAILY) TABS Take 1 tablet by mouth daily   Yes Historical Provider, MD   sertraline (ZOLOFT) 100 mg tablet Take 100 mg by mouth daily at bedtime     Yes Historical Provider, MD       Vitals:   Blood pressure 114/78, pulse 73, temperature (!) 97 1 °F (36 2 °C), temperature source Tympanic, height 6' (1 829 m), weight 107 kg (234 lb 12 8 oz), SpO2 98 % , RA, Body mass index is 31 84 kg/m²         Physical Exam  General: Awake alert and oriented x 3, conversant without conversational dyspnea, NAD, normal affect  HEENT:  PERRL, Sclera noninjected, nonicteric OU, Nares patent,  no craniofacial abnormalities, Mucous membranes, moist, no oral lesions, normal dentition, s/p UPPP procedure, no uvula noted  NECK: Trachea midline, no accessory muscle use, no stridor, no cervical or supraclavicular adenopathy, JVP not elevated  CARDIAC: Reg, single s1/S2, no m/r/g  PULM: CTA bilaterally no wheezing, rhonchi or rales  ABD: Normoactive bowel sounds, soft nontender, nondistended, no rebound, no rigidity, no guarding  EXT: No cyanosis, no clubbing, no edema, normal capillary refill  NEURO: no focal neurologic deficits, AAOx3, moving all extremities appropriately    Labs: I have personally reviewed pertinent lab results  Lab Results   Component Value Date    WBC 6 91 08/04/2017    HGB 15 5 08/04/2017    HCT 45 3 08/04/2017    MCV 88 08/04/2017     09/01/2017      Lab Results   Component Value Date    GLUCOSE 96 08/04/2017    CALCIUM 9 6 08/04/2017     08/04/2017    K 4 2 08/04/2017    CO2 27 08/04/2017     08/04/2017    BUN 15 08/04/2017    CREATININE 1 16 08/04/2017     Sleep studies:  Diagnostic:  Severe - previously noted in [place]    Treated with UPPP with resolution                                       DO Remigio Cowart 73 Sleep Physician

## 2018-06-22 NOTE — PROGRESS NOTES
Sleep Consultation   Charline Mccabe 38 y o  male MRN: [id number]      Reason for consultation: Obstructive sleep apnea    Requesting physician: Dr Frost Saliva    Assessment/Plan  38 y o  y/o M with PMHx of Depression, migraines and Severe KRISTIE s/p UPPP who comes in for evaluation of daytime sleepiness and snoring  1  Severe KRISTIE s/p UPPP surgery - now with recurrence of snoring and daytime sleepiness  Body mass index is 31 84 kg/m²  ,         -  Check a home sleep study to assess for obstructive sleep apnea  Then will move forward with AutoPAP 5-20  -  I discussed in depth the risk of leaving sleep apnea untreated including hypertension, heart failure, arrhythmia, MI and stroke  -  The patient is agreeable to undergo testing and treatment of obstructive sleep apnea  He understands that pitfalls she may encounter along the way and is willing to attempt CPAP treatment  History of Present Illness   HPI:  Charline Mccabe is a 38 y o  male with PMHx as below who comes in for evaluation of severe obstructive sleep apnea  Mr Jalyn Freeman was diagnosed with obstructive sleep apnea and was treated with CPAP  He decided then to be treated with UPPP procedure in hope to get resolution of his obstructive sleep apnea  He even underwent another sleep study to ensure it was resolved  However, over the past few months to year he noted 15 pound weight gain, snoring, excessive daytime sleepiness with an Feura Bush score of 10,and awakenings with dry mouth  he also admits to teeth grinding but he denies symptoms of restless legs, cataplexy, sleep paralysis, hypnopompic or hypnagogic hallucinations  Sleep History:  he goes to bed at approximately 10pm, will get to sleep in 5  min, will get out of bed at 5:45 am   he states that he does not get up at night    he does not nap during he day  ROS:   Review of Systems   Constitutional: Positive for fatigue and unexpected weight change  HENT: Negative      Eyes: Negative  Respiratory: Negative  Cardiovascular: Negative  Gastrointestinal: Negative  Endocrine: Negative  Genitourinary: Negative  Musculoskeletal: Negative  Skin: Negative  Allergic/Immunologic: Negative  Neurological: Positive for headaches  Negative for dizziness, tremors and numbness  Hematological: Negative for adenopathy  Does not bruise/bleed easily  Psychiatric/Behavioral: Positive for dysphoric mood       Historical Information   Past Medical History:   Diagnosis Date    Arthritis     Cancer ([address] )     thyroid    Depression     Disease of thyroid gland     External hemorrhoids     last assessed 12/12/17    Headache     migraines    Hearing aid worn     bilat    Hemorrhoid     Obstructive sleep apnea     last assessed 6/7/16    Sleep apnea     had surgery for it    Tinnitus     Wears contact lenses     Wears glasses      Past Surgical History:   Procedure Laterality Date    ADENOIDECTOMY      HEMORRHOID SURGERY N/A 11/29/2017    Procedure: HEMORRHOIDECTOMY EXCISION EXTERNAL AND INTERNAL;  Surgeon: Cory Corbin MD;  Location: [facility];  Service: General    HERNIA REPAIR      KNEE SURGERY Left     DE THYROID LOBECTOMY,UNILAT Right 8/31/2017    Procedure: HEMITHYROIDECTOMY;  Surgeon: Johnson Calixto MD;  Location:  MAIN OR;  Service: Surgical Oncology    [address] [place] [address]      for sleep apnea    TONSILLECTOMY      UVULOPALATOPHARYNGOPLASTY       Family History   Problem Relation Age of Onset    Stroke Mother     Depression Mother     Hypertension Mother     Depression Father     Melanoma Father     Parkinsonism Father     Bone cancer Maternal Grandmother     Prostate cancer Paternal Grandfather     Stroke Other     Depression Other      Social History     Social History    Marital status: /Civil Union     Spouse name: N/A    Number of children: 2    Years of education: achelors degree     Occupational History  currently working      fulltime     Social History Main Topics    Smoking status: Former Smoker     Packs/day: 0 25     Years: 10 00     Types: Cigarettes     Quit date: 8/31/2014    Smokeless tobacco: Former User    Alcohol use 1 2 oz/week     2 Cans of beer per week      Comment: beer - 'few time' a week    Drug use: No    Sexual activity: Not on file     Other Topics Concern    Not on file     Social History Narrative    No narrative on file       Occupational History: Distribution management    Meds/Allergies   No Known Allergies    Home medications:  Prior to Admission medications    Medication Sig Start Date End Date Taking? Authorizing Provider   butalbital-acetaminophen-caffeine (FIORICET,ESGIC) -40 mg per tablet Take 1 tablet by mouth every 4 (four) hours as needed for migraine     Yes Historical Provider, MD   Multiple Vitamin (MULTI-VITAMIN DAILY) TABS Take 1 tablet by mouth daily   Yes Historical Provider, MD   sertraline (ZOLOFT) 100 mg tablet Take 100 mg by mouth daily at bedtime     Yes Historical Provider, MD       Vitals:   Blood pressure 114/78, pulse 73, temperature (!) 97 1 °F (36 2 °C), temperature source Tympanic, height 6' (1 829 m), weight 107 kg (234 lb 12 8 oz), SpO2 98 % , RA, Body mass index is 31 84 kg/m²         Physical Exam  General: Awake alert and oriented x 3, conversant without conversational dyspnea, NAD, normal affect  HEENT:  PERRL, Sclera noninjected, nonicteric OU, Nares patent,  no craniofacial abnormalities, Mucous membranes, moist, no oral lesions, normal dentition, s/p UPPP procedure, no uvula noted  NECK: Trachea midline, no accessory muscle use, no stridor, no cervical or supraclavicular adenopathy, JVP not elevated  CARDIAC: Reg, single s1/S2, no m/r/g  PULM: CTA bilaterally no wheezing, rhonchi or rales  ABD: Normoactive bowel sounds, soft nontender, nondistended, no rebound, no rigidity, no guarding  EXT: No cyanosis, no clubbing, no edema, normal capillary refill  NEURO: no focal neurologic deficits, AAOx3, moving all extremities appropriately    Labs: I have personally reviewed pertinent lab results  Lab Results   Component Value Date    WBC 6 91 08/04/2017    HGB 15 5 08/04/2017    HCT 45 3 08/04/2017    MCV 88 08/04/2017     09/01/2017      Lab Results   Component Value Date    GLUCOSE 96 08/04/2017    CALCIUM 9 6 08/04/2017     08/04/2017    K 4 2 08/04/2017    CO2 27 08/04/2017     08/04/2017    BUN 15 08/04/2017    CREATININE 1 16 08/04/2017     Sleep studies:  Diagnostic:  Severe - previously noted in [place]    Treated with UPPP with resolution                                       DO Remigio Agustin 73 Sleep Physician

## 2018-06-27 ENCOUNTER — HOSPITAL ENCOUNTER (OUTPATIENT)
Dept: SLEEP CENTER | Facility: HOSPITAL | Age: 43
Discharge: HOME | End: 2018-06-27
Payer: COMMERCIAL

## 2018-06-27 DIAGNOSIS — Z86.69 HISTORY OF OBSTRUCTIVE SLEEP APNEA: ICD-10-CM

## 2018-06-27 PROCEDURE — G0399 HOME SLEEP TEST/TYPE 3 PORTA: HCPCS

## 2018-07-03 DIAGNOSIS — G47.33 OSA (OBSTRUCTIVE SLEEP APNEA): Primary | ICD-10-CM

## 2018-07-06 ENCOUNTER — TELEPHONE (OUTPATIENT)
Dept: PULMONOLOGY | Facility: CLINIC | Age: 43
End: 2018-07-06

## 2018-07-06 NOTE — TELEPHONE ENCOUNTER
Called patient, left message explaining the results of his sleep study  I informed him that a script for an Auto CPAP machine and supplies will be faxed to [facility]  Patient was told to call the office back with any questions or concerns  Office number was given

## 2018-07-09 ENCOUNTER — TRANSCRIBE ORDERS (OUTPATIENT)
Dept: ADMINISTRATIVE | Facility: HOSPITAL | Age: 43
End: 2018-07-09

## 2018-07-09 DIAGNOSIS — S06.0X0A CONCUSSION WITHOUT LOSS OF CONSCIOUSNESS, INITIAL ENCOUNTER: Primary | ICD-10-CM

## 2018-08-29 ENCOUNTER — OFFICE VISIT (OUTPATIENT)
Dept: PULMONOLOGY | Facility: CLINIC | Age: 43
End: 2018-08-29
Payer: COMMERCIAL

## 2018-08-29 VITALS
SYSTOLIC BLOOD PRESSURE: 110 MMHG | HEART RATE: 78 BPM | OXYGEN SATURATION: 98 % | DIASTOLIC BLOOD PRESSURE: 66 MMHG | WEIGHT: 235.4 LBS | HEIGHT: 72 IN | BODY MASS INDEX: 31.89 KG/M2 | TEMPERATURE: 98.2 F

## 2018-08-29 DIAGNOSIS — G47.33 OSA (OBSTRUCTIVE SLEEP APNEA): Primary | ICD-10-CM

## 2018-08-29 PROCEDURE — 99213 OFFICE O/P EST LOW 20 MIN: CPT | Performed by: INTERNAL MEDICINE

## 2018-08-29 NOTE — LETTER
August 29, 2018     Latoya Connelly MD  [address] [facility] [address]    Patient: Traci Talley   YOB: 1975   Date of Visit: 8/29/2018       Dear Dr Orestes Wilburn: Thank you for referring Edie Soulier to me for evaluation  Below are my notes for this consultation  If you have questions, please do not hesitate to call me  I look forward to following your patient along with you  Sincerely,        Merritt Shah DO        CC: No Recipients  Merritt Shah DO  8/29/2018  3:30 PM  Sign at close encounter  Progress Note - Sleep Medicine  Traci Talley 38 y o  male MRN: [id number]       Impression & Plan:   38 y o  M with PMHx of Depression, migraines and Severe KRISTIE s/p UPPP who comes in for follow up on his sleep apnea  1   Mild KRISTIE, previously Severe KRISTIE s/p UPPP surgery - on autoPAP 5- 20 with dreamwear nasal CPAP (DME - [facility])  Average pressure 5- 6 5  Residual AHI - 3  Significant clinical improvement with CPAP  With excellent compliance (100%, 6 hrs 16 mins)      -  C ontinue autoPAP 5-20       -  I  again reminded him of the risk of leaving sleep apnea untreated including hypertension, heart failure, arrhythmia, MI and stroke  -  Will follow up in 1 year for renewal of supplies  ______________________________________________________________________    HPI:    Traci Talley presents today for follow-up after initiation of his CPAP therapy  He states that he has been doing very well with the CPAP  He feels much more refreshed  He denies mask issues, morning headaches, or leaking  He does not feel compelled to nap anymore and has more energy  Review of Systems:  Review of Systems   Constitutional: Negative for diaphoresis and unexpected weight change  HENT: Negative  Eyes: Negative  Respiratory: Negative  Cardiovascular: Negative  Gastrointestinal: Negative  Genitourinary: Negative      Musculoskeletal: Negative  Skin: Negative  Neurological: Negative  Hematological: Negative  Psychiatric/Behavioral: Negative          Social history updates:  History   Smoking Status    Former Smoker    Packs/day: 0 25    Years: 10 00    Types: Cigarettes    Quit date: 8/31/2014   Smokeless Tobacco    Never Used     Social History     Social History    Marital status: /Civil Union     Spouse name: N/A    Number of children: 2    Years of education: achelors degree     Occupational History    currently working      fulltime     Social History Main Topics    Smoking status: Former Smoker     Packs/day: 0 25     Years: 10 00     Types: Cigarettes     Quit date: 8/31/2014    Smokeless tobacco: Never Used    Alcohol use 1 2 oz/week     2 Cans of beer per week      Comment: beer - 'few time' a week    Drug use: No    Sexual activity: Not on file     Other Topics Concern    Not on file     Social History Narrative    No narrative on file       PhysicalExamination:  Vitals:   /66 (BP Location: Left arm, Patient Position: Sitting)   Pulse 78   Temp 98 2 °F (36 8 °C) (Tympanic)   Ht 6' (1 829 m)   Wt 107 kg (235 lb 6 4 oz)   SpO2 98%   BMI 31 93 kg/m²    General:  Awake alert and oriented x 3, conversant without conversational dyspnea, NAD, normal affect  HEENT:  PERRL, Sclera noninjected, nonicteric OU, Nares patent,  no craniofacial abnormalities, Mucous membranes, moist, no oral lesions, normal dentition, Mallampati class 4  NECK: Trachea midline, no accessory muscle use, no stridor, no cervical or supraclavicular adenopathy, JVP not elevated  CARDIAC: Reg, single s1/S2, no m/r/g  PULM: CTA bilaterally no wheezing, rhonchi or rales  ABD: Normoactive bowel sounds, soft nontender, nondistended, no rebound, no rigidity, no guarding  EXT: No cyanosis, no clubbing, no edema, normal capillary refill  NEURO: no focal neurologic deficits, AAOx3, moving all extremities appropriately      Diagnostic Data:  Labs: I personally reviewed the most recent laboratory data pertinent to today's visit  I have personally reviewed pertinent lab results    Lab Results   Component Value Date    WBC 6 91 08/04/2017    HGB 15 5 08/04/2017    HCT 45 3 08/04/2017    MCV 88 08/04/2017     09/01/2017     Lab Results   Component Value Date    CALCIUM 9 6 08/04/2017     08/04/2017    K 4 2 08/04/2017    CO2 27 08/04/2017     08/04/2017    BUN 15 08/04/2017    CREATININE 1 16 08/04/2017     No results found for: IGE  Lab Results   Component Value Date    ALT 75 04/03/2017    AST 32 04/03/2017    ALKPHOS 66 04/03/2017     Sleep studies:  Diagnostic: Mild KRISTIE     Compliance Data:  Type of CPAP:  Auto 5-20                                   Percent usage: 100%                                   Average time used: 6 hrs 16 mins - 9 hrs 10 mins                                  Time in large leak: 2 mins 12 seconds                                   Residual AHI: 3 0                                         Soco Kasper DO

## 2018-08-29 NOTE — PROGRESS NOTES
Progress Note - Sleep Medicine  Nikia Cm 38 y o  male MRN: [id number]       Impression & Plan:   38 y o  M with PMHx of Depression, migraines and Severe KRISTIE s/p UPPP who comes in for follow up on his sleep apnea  1  Mild KRISTIE, previously Severe KRISTIE s/p UPPP surgery - on autoPAP 5- 20 with dreamwear nasal CPAP (DME - [facility])  Average pressure 5- 6 5  Residual AHI - 3  Significant clinical improvement with CPAP  With excellent compliance (100%, 6 hrs 16 mins)      -  Continue autoPAP 5-20       -  I again reminded him of the risk of leaving sleep apnea untreated including hypertension, heart failure, arrhythmia, MI and stroke  -  Will follow up in 1 year for renewal of supplies  ______________________________________________________________________    HPI:    Nikia Cm presents today for follow-up after initiation of his CPAP therapy  He states that he has been doing very well with the CPAP  He feels much more refreshed  He denies mask issues, morning headaches, or leaking  He does not feel compelled to nap anymore and has more energy  Review of Systems:  Review of Systems   Constitutional: Negative for diaphoresis and unexpected weight change  HENT: Negative  Eyes: Negative  Respiratory: Negative  Cardiovascular: Negative  Gastrointestinal: Negative  Genitourinary: Negative  Musculoskeletal: Negative  Skin: Negative  Neurological: Negative  Hematological: Negative  Psychiatric/Behavioral: Negative          Social history updates:  History   Smoking Status    Former Smoker    Packs/day: 0 25    Years: 10 00    Types: Cigarettes    Quit date: 8/31/2014   Smokeless Tobacco    Never Used     Social History     Social History    Marital status: /Civil Union     Spouse name: N/A    Number of children: 2    Years of education: achelors degree     Occupational History    currently working      fulltime     Social History Main Topics  Smoking status: Former Smoker     Packs/day: 0 25     Years: 10 00     Types: Cigarettes     Quit date: 8/31/2014    Smokeless tobacco: Never Used    Alcohol use 1 2 oz/week     2 Cans of beer per week      Comment: beer - 'few time' a week    Drug use: No    Sexual activity: Not on file     Other Topics Concern    Not on file     Social History Narrative    No narrative on file       PhysicalExamination:  Vitals:   /66 (BP Location: Left arm, Patient Position: Sitting)   Pulse 78   Temp 98 2 °F (36 8 °C) (Tympanic)   Ht 6' (1 829 m)   Wt 107 kg (235 lb 6 4 oz)   SpO2 98%   BMI 31 93 kg/m²   General:  Awake alert and oriented x 3, conversant without conversational dyspnea, NAD, normal affect  HEENT:  PERRL, Sclera noninjected, nonicteric OU, Nares patent,  no craniofacial abnormalities, Mucous membranes, moist, no oral lesions, normal dentition, Mallampati class 4  NECK: Trachea midline, no accessory muscle use, no stridor, no cervical or supraclavicular adenopathy, JVP not elevated  CARDIAC: Reg, single s1/S2, no m/r/g  PULM: CTA bilaterally no wheezing, rhonchi or rales  ABD: Normoactive bowel sounds, soft nontender, nondistended, no rebound, no rigidity, no guarding  EXT: No cyanosis, no clubbing, no edema, normal capillary refill  NEURO: no focal neurologic deficits, AAOx3, moving all extremities appropriately      Diagnostic Data:  Labs: I personally reviewed the most recent laboratory data pertinent to today's visit  I have personally reviewed pertinent lab results    Lab Results   Component Value Date    WBC 6 91 08/04/2017    HGB 15 5 08/04/2017    HCT 45 3 08/04/2017    MCV 88 08/04/2017     09/01/2017     Lab Results   Component Value Date    CALCIUM 9 6 08/04/2017     08/04/2017    K 4 2 08/04/2017    CO2 27 08/04/2017     08/04/2017    BUN 15 08/04/2017    CREATININE 1 16 08/04/2017     No results found for: IGE  Lab Results   Component Value Date    ALT 75 04/03/2017    AST 32 04/03/2017    ALKPHOS 66 04/03/2017     Sleep studies:  Diagnostic: Mild KRISTIE     Compliance Data:  Type of CPAP:  Auto 5-20                                   Percent usage: 100%                                   Average time used: 6 hrs 16 mins - 9 hrs 10 mins                                  Time in large leak: 2 mins 12 seconds                                   Residual AHI: 3 0                                         Oxana Garner DO

## 2018-09-06 ENCOUNTER — OFFICE VISIT (OUTPATIENT)
Dept: NEUROSURGERY | Facility: CLINIC | Age: 43
End: 2018-09-06
Payer: COMMERCIAL

## 2018-09-06 ENCOUNTER — TELEPHONE (OUTPATIENT)
Dept: NEUROLOGY | Facility: CLINIC | Age: 43
End: 2018-09-06

## 2018-09-06 VITALS
SYSTOLIC BLOOD PRESSURE: 114 MMHG | DIASTOLIC BLOOD PRESSURE: 78 MMHG | BODY MASS INDEX: 31.69 KG/M2 | RESPIRATION RATE: 16 BRPM | HEIGHT: 72 IN | TEMPERATURE: 97.9 F | HEART RATE: 60 BPM | WEIGHT: 234 LBS

## 2018-09-06 DIAGNOSIS — D18.02 CAVERNOUS HEMANGIOMA OF BRAIN (HCC): Primary | ICD-10-CM

## 2018-09-06 DIAGNOSIS — R51.9 FREQUENT HEADACHES: ICD-10-CM

## 2018-09-06 PROCEDURE — 99213 OFFICE O/P EST LOW 20 MIN: CPT | Performed by: NEUROLOGICAL SURGERY
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
## 2018-10-17 DIAGNOSIS — F32.A DEPRESSION, UNSPECIFIED DEPRESSION TYPE: Primary | ICD-10-CM

## 2018-10-17 RX ORDER — SERTRALINE HYDROCHLORIDE 100 MG/1
TABLET, FILM COATED ORAL
Qty: 90 TABLET | Refills: 2 | Status: SHIPPED | OUTPATIENT
Start: 2018-10-17 | End: 2019-07-11 | Stop reason: SDUPTHER

## 2018-10-22 ENCOUNTER — OFFICE VISIT (OUTPATIENT)
Dept: SURGERY | Facility: CLINIC | Age: 43
End: 2018-10-22
Payer: COMMERCIAL

## 2018-10-22 VITALS
SYSTOLIC BLOOD PRESSURE: 122 MMHG | WEIGHT: 238 LBS | HEART RATE: 69 BPM | HEIGHT: 72 IN | DIASTOLIC BLOOD PRESSURE: 82 MMHG | TEMPERATURE: 97.8 F | BODY MASS INDEX: 32.23 KG/M2 | RESPIRATION RATE: 14 BRPM

## 2018-10-22 DIAGNOSIS — K42.9 UMBILICAL HERNIA WITHOUT OBSTRUCTION AND WITHOUT GANGRENE: Primary | ICD-10-CM

## 2018-10-22 PROCEDURE — 99213 OFFICE O/P EST LOW 20 MIN: CPT | Performed by: SURGERY

## 2018-10-22 NOTE — LETTER
October 22, 2018     Pedro Rapp MD  [id number]     Patient: Danya Campos   YOB: 1975   Date of Visit: 10/22/2018       Dear Dr Duane Menjivar Recipients: Thank you for referring Vanda Chu to me for evaluation  Below are my notes for this consultation  If you have questions, please do not hesitate to call me  I look forward to following your patient along with you  Sincerely,        Anai Joyce MD        CC: No Recipients  Priscilla Do  10/22/2018  8:47 AM  Sign at close encounter  Assessment/Plan:   Danya Campos is a 41 y o male who is here for There were no encounter diagnoses  Patient with known umbilical hernia that has become symptomatic over the past several months  Plan: open repair of Umbilical Hernia  Preoperative Clearance: None      - Patient has been instructed to avoid aspirin containing medications or non-steroidal anti-inflammatory drugs for SEVEN days preceding surgery  Imaging:      _____________________________________________      HPI:  Danya Campos is a 41 y o male who was referred for evaluation of Consult (Umbilical hernia)    Currently complaining of  initial Umbilical Hernia  worse with bending, coughing, lifting, standing, walking,     no nausea and no vomiting,     regular bowel movement      Duration of pain or symptoms:  Intermittent and over 6 months      Prior abdominal surgery:   None      Lab Results   Component Value Date    WBC 6 91 08/04/2017    HGB 15 5 08/04/2017    HCT 45 3 08/04/2017    MCV 88 08/04/2017     09/01/2017     Lab Results   Component Value Date     08/04/2017    K 4 2 08/04/2017     08/04/2017    CO2 27 08/04/2017    BUN 15 08/04/2017    CREATININE 1 16 08/04/2017    GLUF 100 (H) 04/08/2017    CALCIUM 9 6 08/04/2017    AST 32 04/03/2017    ALT 75 04/03/2017    ALKPHOS 66 04/03/2017    EGFR 79 08/04/2017     Lab Results Pt stts he is leaving the country to go to [place], pt leaves July 1 to July 31st. Pt would like to discuss travel and Rx lisinopril.  Please advise         Current Outpatient Prescriptions:                                lisinopril 2.5 MG Oral Tab Take 2.5 Component Value Date    INR 0 94 08/04/2017    PROTIME 12 6 08/04/2017           ROS:  General ROS: negative  negative for - chills, fatigue, fever or night sweats, weight loss  Respiratory ROS: no cough, shortness of breath, or wheezing  Cardiovascular ROS: no chest pain or dyspnea on exertion  Genito-Urinary ROS: no dysuria, trouble voiding, or hematuria  Musculoskeletal ROS: negative for - gait disturbance, joint pain or muscle pain  Neurological ROS: no TIA or stroke symptoms  Abdominal ROS: see HPI  GI ROS: see HPI  Skin ROS: no new rashes or lesions   Lymphatic ROS: no new adenopathy noted by pt  GYN ROS: see HPI, no new GYN history or bleeding noted  Psy ROS: no new mental or behavioral disturbances       Patient Active Problem List   Diagnosis    External hemorrhoids    Allergic rhinitis    Bilateral hearing loss    Arthritis of left knee    [facility]    Cervical spondylosis without myelopathy    Chronic low back pain    Depression    Follicular neoplasm of thyroid    Umbilical hernia    History of obstructive sleep apnea    KRISTIE (obstructive sleep apnea)         Allergies: Patient has no known allergies      Meds:  Current Outpatient Prescriptions:     butalbital-acetaminophen-caffeine (FIORICET,ESGIC) -40 mg per tablet, Take 1 tablet by mouth every 4 (four) hours as needed for migraine  , Disp: , Rfl:     Multiple Vitamin (MULTI-VITAMIN DAILY) TABS, Take 1 tablet by mouth daily, Disp: , Rfl:     sertraline (ZOLOFT) 100 mg tablet, TAKE 1 TABLET BY MOUTH EVERY DAY, Disp: 90 tablet, Rfl: 2    PMH:  Past Medical History:   Diagnosis Date    Arthritis     [facility]     thyroid    Depression     Disease of thyroid gland     External hemorrhoids     last assessed 12/12/17    Headache     migraines    Hearing aid worn     bilat    Hemorrhoid     Obstructive sleep apnea     last assessed 6/7/16    Sleep apnea     had surgery for it    Tinnitus     Wears contact lenses     Wears glasses        PSH:  Past Surgical History:   Procedure Laterality Date    ADENOIDECTOMY      HEMORRHOID SURGERY N/A 11/29/2017    Procedure: HEMORRHOIDECTOMY EXCISION EXTERNAL AND INTERNAL;  Surgeon: Stephen Edward MD;  Location: [facility];  Service: General    HERNIA REPAIR      KNEE SURGERY Left     GA THYROID LOBECTOMY,UNILAT Right 8/31/2017    Procedure: HEMITHYROIDECTOMY;  Surgeon: Ryley Michael MD;  Location:  MAIN OR;  Service: Surgical Oncology    REFRACTIVE SURGERY      THROAT SURGERY      for sleep apnea    TONSILLECTOMY      UVULOPALATOPHARYNGOPLASTY         Family History   Problem Relation Age of Onset   Nixon Helton Stroke Mother     Depression Mother     Hypertension Mother     Depression Father     Melanoma Father     Parkinsonism Father     Bone cancer Maternal Grandmother     Prostate cancer Paternal Grandfather     Stroke Other     Depression Other         reports that he quit smoking about 4 years ago  His smoking use included Cigarettes  He has a 2 50 pack-year smoking history  He has never used smokeless tobacco  He reports that he drinks about 1 2 oz of alcohol per week   He reports that he does not use drugs  PHYSICAL EXAM  General Appearance:    Alert, cooperative, no distress, healthy-appearing   Head:    Normocephalic without obvious abnormality   Eyes:    PERRL, conjunctiva/corneas clear, EOM's intact        Neck:   Supple, no adenopathy, no JVD   Back:     Symmetric, no spinal or CVA tenderness   Lungs:     Clear to auscultation bilaterally, no wheezing or rhonchi   Heart:    Regular rate and rhythm, S1 and S2 normal, no murmur   Abdomen:      abdomen is soft without significant tenderness, masses, organomegaly or guarding Bowel sounds are normal     umbilical hernia  The hernia is, easily reducible,, tender,, The hernia is 2 cm in size  Extremities:   Extremities normal  No clubbing, cyanosis or edema   Psych:   Normal Affect, AOx3  Neurologic:  Skin:   CNII-XII intact  Strength symmetric, speech intact    Warm, dry, intact, no visible rashes or lesions                   Informed consent for procedure was personally discussed, reviewed, and signed by Dr Crystal Mendoza  Discussion by Dr Crystal Mendoza was carried out regarding risks, benefits, and alternatives with the patient  Risks include but are not limited to:  bleeding, infection, and delayed wound healing or an open wound, pulmonary embolus, leaks from bowel or bile ducts or other viscus, transfusions, death  Discussed in further detail the more common complications and their rates of occurrence   was used if necessary  Patient expressed understanding of the issues discussed and wished/consented to proceed  All questions were answered by Dr Crystal Mendoza  Discussion performed between patient and the provider signing below  Signature:   Armin Bruner MD    Date: 10/22/2018 Time: 8:43 AM       Some portions of this record may have been generated with voice recognition software  There may be translation, syntax,  or grammatical errors  Occasional wrong word or "sound-a-like" substitutions may have occurred due to the inherent limitations of the voice recognition software  Read the chart carefully and recognize, using context, where substitutions may have occurred  If you have any questions, please contact the dictating provider for clarification or correction, as needed  This encounter has been coded by a non-certified coder

## 2018-10-22 NOTE — PROGRESS NOTES
Assessment/Plan:   Kaitlin Bass is a 41 y o male who is here for There were no encounter diagnoses  Patient with known umbilical hernia that has become symptomatic over the past several months  Plan: open repair of Umbilical Hernia  Preoperative Clearance: None      - Patient has been instructed to avoid aspirin containing medications or non-steroidal anti-inflammatory drugs for SEVEN days preceding surgery  Imaging:      _____________________________________________      HPI:  Kaitlin Bass is a 41 y o male who was referred for evaluation of Consult (Umbilical hernia)    Currently complaining of  initial Umbilical Hernia  worse with bending, coughing, lifting, standing, walking,     no nausea and no vomiting,     regular bowel movement      Duration of pain or symptoms:  Intermittent and over 6 months      Prior abdominal surgery:   None      Lab Results   Component Value Date    WBC 6 91 08/04/2017    HGB 15 5 08/04/2017    HCT 45 3 08/04/2017    MCV 88 08/04/2017     09/01/2017     Lab Results   Component Value Date     08/04/2017    K 4 2 08/04/2017     08/04/2017    CO2 27 08/04/2017    BUN 15 08/04/2017    CREATININE 1 16 08/04/2017    GLUF 100 (H) 04/08/2017    CALCIUM 9 6 08/04/2017    AST 32 04/03/2017    ALT 75 04/03/2017    ALKPHOS 66 04/03/2017    EGFR 79 08/04/2017     Lab Results   Component Value Date    INR 0 94 08/04/2017    PROTIME 12 6 08/04/2017           ROS:  General ROS: negative  negative for - chills, fatigue, fever or night sweats, weight loss  Respiratory ROS: no cough, shortness of breath, or wheezing  Cardiovascular ROS: no chest pain or dyspnea on exertion  Genito-Urinary ROS: no dysuria, trouble voiding, or hematuria  Musculoskeletal ROS: negative for - gait disturbance, joint pain or muscle pain  Neurological ROS: no TIA or stroke symptoms  Abdominal ROS: see HPI  GI ROS: see HPI  Skin ROS: no new rashes or lesions   Lymphatic ROS: no new adenopathy noted by pt  GYN ROS: see HPI, no new GYN history or bleeding noted  Psy ROS: no new mental or behavioral disturbances       Patient Active Problem List   Diagnosis    External hemorrhoids    Allergic rhinitis    Bilateral hearing loss    Arthritis of left knee    Ca[facility]angioma [facility] ([facility] )    Cervical spondylosis without myelopathy    Chronic low back pain    Depression    Follicular neoplasm of thyroid    Umbilical hernia    History of obstructive sleep apnea    KRISTIE (obstructive sleep apnea)         Allergies: Patient has no known allergies      Meds:  Current Outpatient Prescriptions:     butalbital-acetaminophen-caffeine (FIORICET,ESGIC) -40 mg per tablet, Take 1 tablet by mouth every 4 (four) hours as needed for migraine  , Disp: , Rfl:     Multiple Vitamin (MULTI-VITAMIN DAILY) TABS, Take 1 tablet by mouth daily, Disp: , Rfl:     sertraline (ZOLOFT) 100 mg tablet, TAKE 1 TABLET BY MOUTH EVERY DAY, Disp: 90 tablet, Rfl: 2    PMH:  Past Medical History:   Diagnosis Date    Arthritis     Cancer ([facility] )     thyroid    Depression     Disease of thyroid gland     External hemorrhoids     last assessed 12/12/17    Headache     migraines    Hearing aid worn     bilat    Hemorrhoid     Obstructive sleep apnea     last assessed 6/7/16    Sleep apnea     had surgery for it    Tinnitus     Wears contact lenses     Wears glasses        PSH:  Past Surgical History:   Procedure Laterality Date    ADENOIDECTOMY      HEMORRHOID SURGERY N/A 11/29/2017    Procedure: HEMORRHOIDECTOMY EXCISION EXTERNAL AND INTERNAL;  Surgeon: Kraig Marie MD;  Location: [facility];  Service: General    HERNIA REPAIR      KNEE SURGERY Left     MA THYROID LOBECTOMY,UNILAT Right 8/31/2017    Procedure: HEMITHYROIDECTOMY;  Surgeon: Beatriz Uribe MD;  [facility];  Service: Surgical Oncology    REFRACTIVE SURGERY      THROAT SURGERY      for sleep apnea    TONSILLECTOMY  UVULOPALATOPHARYNGOPLASTY         Family History   Problem Relation Age of Onset   Ardeth Needs Stroke Mother     Depression Mother     Hypertension Mother     Depression Father     Melanoma Father     Parkinsonism Father     Bone cancer Maternal Grandmother     Prostate cancer Paternal Grandfather     Stroke Other     Depression Other         reports that he quit smoking about 4 years ago  His smoking use included Cigarettes  He has a 2 50 pack-year smoking history  He has never used smokeless tobacco  He reports that he drinks about 1 2 oz of alcohol per week   He reports that he does not use drugs  PHYSICAL EXAM  General Appearance:    Alert, cooperative, no distress, healthy-appearing   Head:    Normocephalic without obvious abnormality   Eyes:    PERRL, conjunctiva/corneas clear, EOM's intact        Neck:   Supple, no adenopathy, no JVD   Back:     Symmetric, no spinal or CVA tenderness   Lungs:     Clear to auscultation bilaterally, no wheezing or rhonchi   Heart:    Regular rate and rhythm, S1 and S2 normal, no murmur   Abdomen:      abdomen is soft without significant tenderness, masses, organomegaly or guarding Bowel sounds are normal     umbilical hernia  The hernia is, easily reducible,, tender,, The hernia is 2 cm in size  Extremities:   Extremities normal  No clubbing, cyanosis or edema   Psych:   Normal Affect, AOx3  Neurologic:  Skin:   CNII-XII intact  Strength symmetric, speech intact    Warm, dry, intact, no visible rashes or lesions                   Informed consent for procedure was personally discussed, reviewed, and signed by Dr Mixon Corporal  Discussion by Dr Mixon Corporal was carried out regarding risks, benefits, and alternatives with the patient  Risks include but are not limited to:  bleeding, infection, and delayed wound healing or an open wound, pulmonary embolus, leaks from bowel or bile ducts or other viscus, transfusions, death    Discussed in further detail the more common complications and their rates of occurrence   was used if necessary  Patient expressed understanding of the issues discussed and wished/consented to proceed  All questions were answered by Dr Adrian Portillo  Discussion performed between patient and the provider signing below  Signature:   Micah Harris MD    Date: 10/22/2018 Time: 8:43 AM       Some portions of this record may have been generated with voice recognition software  There may be translation, syntax,  or grammatical errors  Occasional wrong word or "sound-a-like" substitutions may have occurred due to the inherent limitations of the voice recognition software  Read the chart carefully and recognize, using context, where substitutions may have occurred  If you have any questions, please contact the dictating provider for clarification or correction, as needed  This encounter has been coded by a non-certified coder

## 2018-10-24 ENCOUNTER — TELEPHONE (OUTPATIENT)
Dept: FAMILY MEDICINE CLINIC | Facility: CLINIC | Age: 43
End: 2018-10-24

## 2018-10-24 PROBLEM — K42.9 UMBILICAL HERNIA WITHOUT OBSTRUCTION OR GANGRENE: Status: ACTIVE | Noted: 2018-10-24

## 2018-11-26 ENCOUNTER — OFFICE VISIT (OUTPATIENT)
Dept: SURGERY | Facility: CLINIC | Age: 43
End: 2018-11-26
Payer: COMMERCIAL

## 2018-11-26 VITALS
BODY MASS INDEX: 32.23 KG/M2 | TEMPERATURE: 97.8 F | WEIGHT: 238 LBS | DIASTOLIC BLOOD PRESSURE: 82 MMHG | RESPIRATION RATE: 16 BRPM | SYSTOLIC BLOOD PRESSURE: 138 MMHG | HEART RATE: 77 BPM | HEIGHT: 72 IN

## 2018-11-26 DIAGNOSIS — K42.9 UMBILICAL HERNIA WITHOUT OBSTRUCTION OR GANGRENE: Primary | ICD-10-CM

## 2018-11-26 PROCEDURE — 99212 OFFICE O/P EST SF 10 MIN: CPT | Performed by: SURGERY

## 2018-11-26 NOTE — PROGRESS NOTES
Assessment/Plan:   Edgard Curtis is a 41 y o male who is here for The encounter diagnosis was Umbilical hernia without obstruction or gangrene  Patient is a known umbilical hernia and bulge which has progressively become more noticeable and symptomatic over the past 6 months  Plan: open repair of Umbilical Hernia  Preoperative Clearance: None      - Patient has been instructed to avoid aspirin containing medications or non-steroidal anti-inflammatory drugs for SEVEN days preceding surgery  Imaging:      _____________________________________________      HPI:  Edgard Curtis is a 41 y o male who was referred for evaluation of Pre-op Exam (update h&P for hernia surgery on 12/6/18)    Currently complaining of  persistent Umbilical Hernia  worse with bending, coughing, lifting, standing, walking,     no nausea and no vomiting,     regular bowel movement      Duration of pain or symptoms:  Intermittent and over 6 months      Prior abdominal surgery:   None      Lab Results   Component Value Date    WBC 6 91 08/04/2017    HGB 15 5 08/04/2017    HCT 45 3 08/04/2017    MCV 88 08/04/2017     09/01/2017     Lab Results   Component Value Date    K 4 2 08/04/2017     08/04/2017    CO2 27 08/04/2017    BUN 15 08/04/2017    CREATININE 1 16 08/04/2017    GLUF 100 (H) 04/08/2017    CALCIUM 9 6 08/04/2017    AST 32 04/03/2017    ALT 75 04/03/2017    ALKPHOS 66 04/03/2017    EGFR 79 08/04/2017     Lab Results   Component Value Date    INR 0 94 08/04/2017    PROTIME 12 6 08/04/2017           ROS:  General ROS: negative  negative for - chills, fatigue, fever or night sweats, weight loss  Respiratory ROS: no cough, shortness of breath, or wheezing  Cardiovascular ROS: no chest pain or dyspnea on exertion  Genito-Urinary ROS: no dysuria, trouble voiding, or hematuria  Musculoskeletal ROS: negative for - gait disturbance, joint pain or muscle pain  Neurological ROS: no TIA or stroke symptoms  Abdominal ROS: see HPI  GI ROS: see HPI  Skin ROS: no new rashes or lesions   Lymphatic ROS: no new adenopathy noted by pt  GYN ROS: see HPI, no new GYN history or bleeding noted  Psy ROS: no new mental or behavioral disturbances       Patient Active Problem List   Diagnosis    External hemorrhoids    Allergic rhinitis    Bilateral hearing loss    Arthritis of left knee    [facility]    Cervical spondylosis without myelopathy    Chronic low back pain    Depression    Follicular neoplasm of thyroid    Umbilical hernia    History of obstructive sleep apnea    KRISTIE (obstructive sleep apnea)    Umbilical hernia without obstruction or gangrene         Allergies: Patient has no known allergies      Meds:  Current Outpatient Prescriptions:     butalbital-acetaminophen-caffeine (FIORICET,ESGIC) -40 mg per tablet, Take 1 tablet by mouth every 4 (four) hours as needed for migraine  , Disp: , Rfl:     Multiple Vitamin (MULTI-VITAMIN DAILY) TABS, Take 1 tablet by mouth daily, Disp: , Rfl:     sertraline (ZOLOFT) 100 mg tablet, TAKE 1 TABLET BY MOUTH EVERY DAY, Disp: 90 tablet, Rfl: 2    PMH:  Past Medical History:   Diagnosis Date    Arthritis     [facility]     thyroid    Depression     Disease of thyroid gland     External hemorrhoids     last assessed 12/12/17    Headache     migraines    Hearing aid worn     bilat    Hemorrhoid     Obstructive sleep apnea     last assessed 6/7/16    Sleep apnea     had surgery for it    Tinnitus     Wears contact lenses     Wears glasses        PSH:  Past Surgical History:   Procedure Laterality Date    ADENOIDECTOMY      HEMORRHOID SURGERY N/A 11/29/2017    Procedure: HEMORRHOIDECTOMY EXCISION EXTERNAL AND INTERNAL;  Surgeon: Cindy Perez MD;  Location: [facility];  Service: General    HERNIA REPAIR      KNEE SURGERY Left     DC THYROID LOBECTOMY,UNILAT Right 8/31/2017    Procedure: HEMITHYROIDECTOMY; Surgeon: Steve Matos MD;  [facility];  Service: Surgical Oncology    REFRACTIVE SURGERY      THROAT SURGERY      for sleep apnea    TONSILLECTOMY      UVULOPALATOPHARYNGOPLASTY         Family History   Problem Relation Age of Onset   Exdarius Pabloe Stroke Mother     Depression Mother     Hypertension Mother     Depression Father     Melanoma Father     Parkinsonism Father     Bone cancer Maternal Grandmother     Prostate cancer Paternal Grandfather     Stroke Other     Depression Other         reports that he quit smoking about 4 years ago  His smoking use included Cigarettes  He has a 2 50 pack-year smoking history  He has never used smokeless tobacco  He reports that he drinks about 1 2 oz of alcohol per week   He reports that he does not use drugs  PHYSICAL EXAM  General Appearance:    Alert, cooperative, no distress, healthy   Head:    Normocephalic without obvious abnormality   Eyes:    PERRL, conjunctiva/corneas clear, EOM's intact        Neck:   Supple, no adenopathy, no JVD   Back:     Symmetric, no spinal or CVA tenderness   Lungs:     Clear to auscultation bilaterally, no wheezing or rhonchi   Heart:    Regular rate and rhythm, S1 and S2 normal, no murmur   Abdomen:      abdomen is soft without significant tenderness, masses, organomegaly or guarding Bowel sounds are normal     umbilical hernia  The hernia is, tender,   Extremities:   Extremities normal  No clubbing, cyanosis or edema   Psych:   Normal Affect, AOx3  Neurologic:  Skin:   CNII-XII intact  Strength symmetric, speech intact    Warm, dry, intact, no visible rashes or lesions                   Informed consent for procedure was personally discussed, reviewed, and signed by Dr Nishi Hamlin  Discussion by Dr Nishi Hamlin was carried out regarding risks, benefits, and alternatives with the patient   Risks include but are not limited to:  bleeding, infection, and delayed wound healing or an open wound, pulmonary embolus, leaks from bowel or bile ducts or other viscus, transfusions, death  Discussed in further detail the more common complications and their rates of occurrence   was used if necessary  Patient expressed understanding of the issues discussed and wished/consented to proceed  All questions were answered by Dr Velásquez Life  Discussion performed between patient and the provider signing below  Signature:   Jenn Sun MD    Date: 11/26/2018 Time: 10:03 AM       Some portions of this record may have been generated with voice recognition software  There may be translation, syntax,  or grammatical errors  Occasional wrong word or "sound-a-like" substitutions may have occurred due to the inherent limitations of the voice recognition software  Read the chart carefully and recognize, using context, where substitutions may have occurred  If you have any questions, please contact the dictating provider for clarification or correction, as needed  This encounter has been coded by a non-certified coder

## 2018-11-26 NOTE — LETTER
November 26, 2018     Sergo Son MD  [id number] [place] [address]    Patient: Israel Toussaint   YOB: 1975   Date of Visit: 11/26/2018       Dear Dr Ben Tomas: Thank you for referring Nancy Rodriguez to me for evaluation  Below are my notes for this consultation  If you have questions, please do not hesitate to call me  I look forward to following your patient along with you  Sincerely,        Nikolay Treviño MD        CC: No Recipients  Laura Primus  11/26/2018 10:05 AM  Sign at close encounter  Assessment/Plan:   Israel Toussaint is a 41 y o male who is here for The encounter diagnosis was Umbilical hernia without obstruction or gangrene  Patient is a known umbilical hernia and bulge which has progressively become more noticeable and symptomatic over the past 6 months  Plan: open repair of Umbilical Hernia  Preoperative Clearance: None      - Patient has been instructed to avoid aspirin containing medications or non-steroidal anti-inflammatory drugs for SEVEN days preceding surgery  Imaging:      _____________________________________________      HPI:  Israel Toussaint is a 41 y o male who was referred for evaluation of Pre-op Exam (update h&P for hernia surgery on 12/6/18)    Currently complaining of  persistent Umbilical Hernia  worse with bending, coughing, lifting, standing, walking,     no nausea and no vomiting,     regular bowel movement      Duration of pain or symptoms:  Intermittent and over 6 months      Prior abdominal surgery:   None      Lab Results   Component Value Date    WBC 6 91 08/04/2017    HGB 15 5 08/04/2017    HCT 45 3 08/04/2017    MCV 88 08/04/2017     09/01/2017     Lab Results   Component Value Date    K 4 2 08/04/2017     08/04/2017    CO2 27 08/04/2017    BUN 15 08/04/2017    CREATININE 1 16 08/04/2017    GLUF 100 (H) 04/08/2017    CALCIUM 9 6 08/04/2017    AST 32 04/03/2017    ALT 75 04/03/2017    ALKPHOS 66 04/03/2017    EGFR 79 08/04/2017     Lab Results   Component Value Date    INR 0 94 08/04/2017    PROTIME 12 6 08/04/2017           ROS:  General ROS: negative  negative for - chills, fatigue, fever or night sweats, weight loss  Respiratory ROS: no cough, shortness of breath, or wheezing  Cardiovascular ROS: no chest pain or dyspnea on exertion  Genito-Urinary ROS: no dysuria, trouble voiding, or hematuria  Musculoskeletal ROS: negative for - gait disturbance, joint pain or muscle pain  Neurological ROS: no TIA or stroke symptoms  Abdominal ROS: see HPI  GI ROS: see HPI  Skin ROS: no new rashes or lesions   Lymphatic ROS: no new adenopathy noted by pt  GYN ROS: see HPI, no new GYN history or bleeding noted  Psy ROS: no new mental or behavioral disturbances       Patient Active Problem List   Diagnosis    External hemorrhoids    Allergic rhinitis    Bilateral hearing loss    Arthritis of left knee    [facility]    Cervical spondylosis without myelopathy    Chronic low back pain    Depression    Follicular neoplasm of thyroid    Umbilical hernia    History of obstructive sleep apnea    KRISTIE (obstructive sleep apnea)    Umbilical hernia without obstruction or gangrene         Allergies: Patient has no known allergies      Meds:  Current Outpatient Prescriptions:     butalbital-acetaminophen-caffeine (FIORICET,ESGIC) -40 mg per tablet, Take 1 tablet by mouth every 4 (four) hours as needed for migraine  , Disp: , Rfl:     Multiple Vitamin (MULTI-VITAMIN DAILY) TABS, Take 1 tablet by mouth daily, Disp: , Rfl:     sertraline (ZOLOFT) 100 mg tablet, TAKE 1 TABLET BY MOUTH EVERY DAY, Disp: 90 tablet, Rfl: 2    PMH:  Past Medical History:   Diagnosis Date    Arthritis     [facility]     thyroid    Depression     Disease of thyroid gland     External hemorrhoids     last assessed 12/12/17    Headache     migraines    Hearing aid worn     bilat  Hemorrhoid     Obstructive sleep apnea     last assessed 6/7/16    Sleep apnea     had surgery for it    Tinnitus     Wears contact lenses     Wears glasses        PSH:  Past Surgical History:   Procedure Laterality Date    ADENOIDECTOMY      HEMORRHOID SURGERY N/A 11/29/2017    Procedure: HEMORRHOIDECTOMY EXCISION EXTERNAL AND INTERNAL;  Surgeon: Christopher Ndiaye MD;  [facility];  Service: General    HERNIA REPAIR      KNEE SURGERY Left     MD THYROID LOBECTOMY,UNILAT Right 8/31/2017    Procedure: HEMITHYROIDECTOMY;  Surgeon: Yolande Avery MD;  Location:  MAIN OR;  Service: Surgical Oncology    REFRACTIVE SURGERY      THROAT SURGERY      for sleep apnea    TONSILLECTOMY      UVULOPALATOPHARYNGOPLASTY         Family History   Problem Relation Age of Onset   [de-identified] Stroke Mother     Depression Mother     Hypertension Mother     Depression Father     Melanoma Father     Parkinsonism Father     Bone cancer Maternal Grandmother     Prostate cancer Paternal Grandfather     Stroke Other     Depression Other         reports that he quit smoking about 4 years ago  His smoking use included Cigarettes  He has a 2 50 pack-year smoking history  He has never used smokeless tobacco  He reports that he drinks about 1 2 oz of alcohol per week   He reports that he does not use drugs        PHYSICAL EXAM  General Appearance:    Alert, cooperative, no distress, healthy   Head:    Normocephalic without obvious abnormality   Eyes:    PERRL, conjunctiva/corneas clear, EOM's intact        Neck:   Supple, no adenopathy, no JVD   Back:     Symmetric, no spinal or CVA tenderness   Lungs:     Clear to auscultation bilaterally, no wheezing or rhonchi   Heart:    Regular rate and rhythm, S1 and S2 normal, no murmur   Abdomen:      abdomen is soft without significant tenderness, masses, organomegaly or guarding Bowel sounds are normal     umbilical hernia  The hernia is, tender,   Extremities:   Extremities normal  No clubbing, cyanosis or edema   Psych:   Normal Affect, AOx3  Neurologic:  Skin:   CNII-XII intact  Strength symmetric, speech intact    Warm, dry, intact, no visible rashes or lesions                   Informed consent for procedure was personally discussed, reviewed, and signed by Dr Krissy Franco  Discussion by Dr Krissy Franco was carried out regarding risks, benefits, and alternatives with the patient  Risks include but are not limited to:  bleeding, infection, and delayed wound healing or an open wound, pulmonary embolus, leaks from bowel or bile ducts or other viscus, transfusions, death  Discussed in further detail the more common complications and their rates of occurrence   was used if necessary  Patient expressed understanding of the issues discussed and wished/consented to proceed  All questions were answered by Dr Krissy Franco  Discussion performed between patient and the provider signing below  Signature:   Nadege Flores MD    Date: 11/26/2018 Time: 10:03 AM       Some portions of this record may have been generated with voice recognition software  There may be translation, syntax,  or grammatical errors  Occasional wrong word or "sound-a-like" substitutions may have occurred due to the inherent limitations of the voice recognition software  Read the chart carefully and recognize, using context, where substitutions may have occurred  If you have any questions, please contact the dictating provider for clarification or correction, as needed  This encounter has been coded by a non-certified coder

## 2018-12-05 ENCOUNTER — ANESTHESIA EVENT (OUTPATIENT)
Dept: PERIOP | Facility: HOSPITAL | Age: 43
End: 2018-12-05
Payer: COMMERCIAL

## 2018-12-05 NOTE — PRE-PROCEDURE INSTRUCTIONS
Pre-Surgery Instructions:   Medication Instructions    butalbital-acetaminophen-caffeine (FIORICET,ESGIC) -40 mg per tablet Instructed patient per Anesthesia Guidelines   Multiple Vitamin (MULTI-VITAMIN DAILY) TABS Instructed patient per Anesthesia Guidelines   sertraline (ZOLOFT) 100 mg tablet Instructed patient per Anesthesia Guidelines    Patient given/ instructed on use of chlorhexidine soap per hospital protocol    Patient instructed to stop all ASA, NSAIDS, vitamins and herbal supplements one week prior to surgery or per Dr Norma Jones

## 2018-12-06 ENCOUNTER — HOSPITAL ENCOUNTER (OUTPATIENT)
Facility: HOSPITAL | Age: 43
Setting detail: OUTPATIENT SURGERY
Discharge: HOME | End: 2018-12-06
Attending: SURGERY | Admitting: SURGERY
Payer: COMMERCIAL

## 2018-12-06 ENCOUNTER — ANESTHESIA (OUTPATIENT)
Dept: PERIOP | Facility: HOSPITAL | Age: 43
End: 2018-12-06
Payer: COMMERCIAL

## 2018-12-06 VITALS
BODY MASS INDEX: 31.15 KG/M2 | WEIGHT: 230 LBS | TEMPERATURE: 97.6 F | DIASTOLIC BLOOD PRESSURE: 72 MMHG | HEART RATE: 66 BPM | OXYGEN SATURATION: 96 % | RESPIRATION RATE: 12 BRPM | HEIGHT: 72 IN | SYSTOLIC BLOOD PRESSURE: 114 MMHG

## 2018-12-06 DIAGNOSIS — K42.9 UMBILICAL HERNIA WITHOUT OBSTRUCTION OR GANGRENE: Primary | ICD-10-CM

## 2018-12-06 PROCEDURE — 49585 PR REPAIR UMBILICAL HERN,5+Y/O,REDUC: CPT | Performed by: PHYSICIAN ASSISTANT

## 2018-12-06 PROCEDURE — C1781 MESH (IMPLANTABLE): HCPCS | Performed by: SURGERY

## 2018-12-06 PROCEDURE — 49585 PR REPAIR UMBILICAL HERN,5+Y/O,REDUC: CPT | Performed by: SURGERY

## 2018-12-06 DEVICE — BARD VENTRALEX HERNIA PATCH, 4.3 CM (1.7"), SMALL CIRCLE WITH STRAP
Type: IMPLANTABLE DEVICE | Site: UMBILICAL | Status: FUNCTIONAL
Brand: VENTRALEX

## 2018-12-06 RX ORDER — ONDANSETRON 2 MG/ML
INJECTION INTRAMUSCULAR; INTRAVENOUS AS NEEDED
Status: DISCONTINUED | OUTPATIENT
Start: 2018-12-06 | End: 2018-12-06 | Stop reason: SURG

## 2018-12-06 RX ORDER — HYDROCODONE BITARTRATE AND ACETAMINOPHEN 5; 325 MG/1; MG/1
1-2 TABLET ORAL EVERY 4 HOURS PRN
Qty: 30 TABLET | Refills: 0 | Status: SHIPPED | OUTPATIENT
Start: 2018-12-06 | End: 2019-04-08 | Stop reason: ALTCHOICE

## 2018-12-06 RX ORDER — GLYCOPYRROLATE 0.2 MG/ML
INJECTION INTRAMUSCULAR; INTRAVENOUS AS NEEDED
Status: DISCONTINUED | OUTPATIENT
Start: 2018-12-06 | End: 2018-12-06 | Stop reason: SURG

## 2018-12-06 RX ORDER — HYDROCODONE BITARTRATE AND ACETAMINOPHEN 5; 325 MG/1; MG/1
1 TABLET ORAL EVERY 4 HOURS PRN
Status: DISCONTINUED | OUTPATIENT
Start: 2018-12-06 | End: 2018-12-06 | Stop reason: HOSPADM

## 2018-12-06 RX ORDER — SODIUM CHLORIDE 9 MG/ML
125 INJECTION, SOLUTION INTRAVENOUS CONTINUOUS
Status: DISCONTINUED | OUTPATIENT
Start: 2018-12-06 | End: 2018-12-06 | Stop reason: HOSPADM

## 2018-12-06 RX ORDER — DEXTROSE AND SODIUM CHLORIDE 5; .45 G/100ML; G/100ML
80 INJECTION, SOLUTION INTRAVENOUS CONTINUOUS
Status: DISCONTINUED | OUTPATIENT
Start: 2018-12-06 | End: 2018-12-06 | Stop reason: HOSPADM

## 2018-12-06 RX ORDER — MIDAZOLAM HYDROCHLORIDE 1 MG/ML
INJECTION INTRAMUSCULAR; INTRAVENOUS AS NEEDED
Status: DISCONTINUED | OUTPATIENT
Start: 2018-12-06 | End: 2018-12-06 | Stop reason: SURG

## 2018-12-06 RX ORDER — CEFAZOLIN SODIUM 2 G/50ML
2000 SOLUTION INTRAVENOUS ONCE
Status: COMPLETED | OUTPATIENT
Start: 2018-12-06 | End: 2018-12-06

## 2018-12-06 RX ORDER — DOCUSATE SODIUM 100 MG/1
100 CAPSULE, LIQUID FILLED ORAL 2 TIMES DAILY
Qty: 60 CAPSULE | Refills: 0 | Status: SHIPPED | OUTPATIENT
Start: 2018-12-06 | End: 2019-04-08 | Stop reason: ALTCHOICE

## 2018-12-06 RX ORDER — ONDANSETRON 4 MG/1
4 TABLET, ORALLY DISINTEGRATING ORAL EVERY 4 HOURS PRN
Status: DISCONTINUED | OUTPATIENT
Start: 2018-12-06 | End: 2018-12-06 | Stop reason: HOSPADM

## 2018-12-06 RX ORDER — ALBUTEROL SULFATE 2.5 MG/3ML
2.5 SOLUTION RESPIRATORY (INHALATION) ONCE AS NEEDED
Status: DISCONTINUED | OUTPATIENT
Start: 2018-12-06 | End: 2018-12-06 | Stop reason: HOSPADM

## 2018-12-06 RX ORDER — ONDANSETRON 2 MG/ML
4 INJECTION INTRAMUSCULAR; INTRAVENOUS EVERY 4 HOURS PRN
Status: DISCONTINUED | OUTPATIENT
Start: 2018-12-06 | End: 2018-12-06 | Stop reason: HOSPADM

## 2018-12-06 RX ORDER — PROPOFOL 10 MG/ML
INJECTION, EMULSION INTRAVENOUS AS NEEDED
Status: DISCONTINUED | OUTPATIENT
Start: 2018-12-06 | End: 2018-12-06 | Stop reason: SURG

## 2018-12-06 RX ORDER — HYDROMORPHONE HCL/PF 1 MG/ML
1 SYRINGE (ML) INJECTION
Status: DISCONTINUED | OUTPATIENT
Start: 2018-12-06 | End: 2018-12-06 | Stop reason: HOSPADM

## 2018-12-06 RX ORDER — MEPERIDINE HYDROCHLORIDE 50 MG/ML
12.5 INJECTION INTRAMUSCULAR; INTRAVENOUS; SUBCUTANEOUS AS NEEDED
Status: DISCONTINUED | OUTPATIENT
Start: 2018-12-06 | End: 2018-12-06 | Stop reason: HOSPADM

## 2018-12-06 RX ORDER — KETOROLAC TROMETHAMINE 30 MG/ML
INJECTION, SOLUTION INTRAMUSCULAR; INTRAVENOUS AS NEEDED
Status: DISCONTINUED | OUTPATIENT
Start: 2018-12-06 | End: 2018-12-06 | Stop reason: SURG

## 2018-12-06 RX ORDER — NEOSTIGMINE METHYLSULFATE 1 MG/ML
INJECTION INTRAVENOUS AS NEEDED
Status: DISCONTINUED | OUTPATIENT
Start: 2018-12-06 | End: 2018-12-06 | Stop reason: SURG

## 2018-12-06 RX ORDER — FENTANYL CITRATE/PF 50 MCG/ML
50 SYRINGE (ML) INJECTION
Status: DISCONTINUED | OUTPATIENT
Start: 2018-12-06 | End: 2018-12-06 | Stop reason: HOSPADM

## 2018-12-06 RX ORDER — ACETAMINOPHEN 325 MG/1
650 TABLET ORAL EVERY 6 HOURS PRN
Status: DISCONTINUED | OUTPATIENT
Start: 2018-12-06 | End: 2018-12-06 | Stop reason: HOSPADM

## 2018-12-06 RX ORDER — ONDANSETRON 4 MG/1
4 TABLET, FILM COATED ORAL EVERY 8 HOURS PRN
Qty: 20 TABLET | Refills: 0 | Status: SHIPPED | OUTPATIENT
Start: 2018-12-06 | End: 2019-04-08 | Stop reason: ALTCHOICE

## 2018-12-06 RX ORDER — HYDROMORPHONE HCL/PF 1 MG/ML
0.5 SYRINGE (ML) INJECTION
Status: DISCONTINUED | OUTPATIENT
Start: 2018-12-06 | End: 2018-12-06 | Stop reason: HOSPADM

## 2018-12-06 RX ORDER — LIDOCAINE HYDROCHLORIDE 10 MG/ML
INJECTION, SOLUTION INFILTRATION; PERINEURAL AS NEEDED
Status: DISCONTINUED | OUTPATIENT
Start: 2018-12-06 | End: 2018-12-06 | Stop reason: SURG

## 2018-12-06 RX ORDER — ROCURONIUM BROMIDE 10 MG/ML
INJECTION, SOLUTION INTRAVENOUS AS NEEDED
Status: DISCONTINUED | OUTPATIENT
Start: 2018-12-06 | End: 2018-12-06 | Stop reason: SURG

## 2018-12-06 RX ORDER — FENTANYL CITRATE 50 UG/ML
INJECTION, SOLUTION INTRAMUSCULAR; INTRAVENOUS AS NEEDED
Status: DISCONTINUED | OUTPATIENT
Start: 2018-12-06 | End: 2018-12-06 | Stop reason: SURG

## 2018-12-06 RX ADMIN — SODIUM CHLORIDE: 0.9 INJECTION, SOLUTION INTRAVENOUS at 08:55

## 2018-12-06 RX ADMIN — ROCURONIUM BROMIDE 30 MG: 10 INJECTION INTRAVENOUS at 08:11

## 2018-12-06 RX ADMIN — CEFAZOLIN SODIUM 2000 MG: 2 SOLUTION INTRAVENOUS at 08:09

## 2018-12-06 RX ADMIN — DEXAMETHASONE SODIUM PHOSPHATE 4 MG: 10 INJECTION INTRAMUSCULAR; INTRAVENOUS at 08:35

## 2018-12-06 RX ADMIN — GLYCOPYRROLATE 0.2 MG: 0.2 INJECTION, SOLUTION INTRAMUSCULAR; INTRAVENOUS at 08:35

## 2018-12-06 RX ADMIN — MIDAZOLAM 2 MG: 1 INJECTION INTRAMUSCULAR; INTRAVENOUS at 08:11

## 2018-12-06 RX ADMIN — ONDANSETRON HYDROCHLORIDE 4 MG: 2 INJECTION, SOLUTION INTRAVENOUS at 08:35

## 2018-12-06 RX ADMIN — FENTANYL CITRATE 50 MCG: 50 INJECTION INTRAMUSCULAR; INTRAVENOUS at 09:32

## 2018-12-06 RX ADMIN — PROPOFOL 200 MG: 10 INJECTION, EMULSION INTRAVENOUS at 08:11

## 2018-12-06 RX ADMIN — SODIUM CHLORIDE 125 ML/HR: 0.9 INJECTION, SOLUTION INTRAVENOUS at 09:35

## 2018-12-06 RX ADMIN — SODIUM CHLORIDE 125 ML/HR: 0.9 INJECTION, SOLUTION INTRAVENOUS at 07:28

## 2018-12-06 RX ADMIN — FENTANYL CITRATE 100 MCG: 50 INJECTION, SOLUTION INTRAMUSCULAR; INTRAVENOUS at 08:11

## 2018-12-06 RX ADMIN — GLYCOPYRROLATE 0.6 MG: 0.2 INJECTION, SOLUTION INTRAMUSCULAR; INTRAVENOUS at 09:03

## 2018-12-06 RX ADMIN — NEOSTIGMINE METHYLSULFATE 4 MG: 1 INJECTION INTRAVENOUS at 09:03

## 2018-12-06 RX ADMIN — LIDOCAINE HYDROCHLORIDE 100 MG: 10 INJECTION, SOLUTION INFILTRATION; PERINEURAL at 08:11

## 2018-12-06 RX ADMIN — KETOROLAC TROMETHAMINE 30 MG: 30 INJECTION, SOLUTION INTRAMUSCULAR at 08:35

## 2018-12-06 NOTE — OP NOTE
REPAIR HERNIA UMBILICAL  WITH    MESH  Postoperative Note  PATIENT NAME: Margaux Sandhu  : 1975  MRN: [id number]  [facility] RO[facility]    Surgery Date: 2018      Consent:  The risks, benefits, and alternatives to the surgery were discussed with the patient and with the family prior to surgery, personally by Dr Barry Teixeira  If the consent was obtained by the physician assistant or other representative, the consent was reviewed once again personally by the operating physician  Common complications particular for this procedure as well as unusual complications were discussed, including but not limited to:  bleeding, wound infection, prolonged wound healing, open wounds, reoperation, leak from the bowel or viscus, leak from the bile duct or injury to adjacent or other organs or blood vessels in the abdomen  A  was used if necessary  The patient expressed understanding of the issues discussed and wished and consented to the procedure to proceed  All questions were answered  Dr Barry Teixeira personally discussed the informed consent with this patient  Pre operative diagnosis:   Umbilical hernia without obstruction or gangrene [K42 9]    Operative Indications:  Symptomatic Umbilical Hernia    Operative Findings:  1 5 cm defect pre peritoneal     Post operative diagnosis:  Post-Op Diagnosis Codes:     * Umbilical hernia without obstruction or gangrene [K42 9]    Procedure:  Procedure(s):  REPAIR HERNIA UMBILICAL  WITH    MESH    Surgeon(s) and Role:     * Otilia Ayers MD - Primary     * Zachery Gupta PA-C - Assisting    The Physician Assistant was medically necessary for surgical safety the case including suturing, retraction, and hemostasis  No qualified resident was available  I was present for the entire procedure  Drains:       Specimens:  * No specimens in log *    Estimated Blood Loss:   Minimal    Anesthesia Type:   Choice     Procedure:   The patient was seen in the Holding Room  The risks, benefits, complications, treatment options, and expected outcomes were discussed with the patient  The possibilities of reaction to medication, pulmonary aspiration, perforation of viscus, bleeding, recurrent infection, the need for additional procedures, failure to diagnose a condition, and creating a complication requiring transfusion or operation were discussed with the patient  The patient concurred with the proposed plan, giving informed consent  The site of surgery properly noted/marked  The patient was taken to Operating Room  A Time Out was held and the above information confirmed  The patient was prepped and draped in a sterile fashion  An additional timeout was performed  An infraumbilical incision was made, dissection carried out to the hernia sac  Hernia sac was freed of its surrounding adhesions  The hernia sac was opened and its contents reduced  The sac was suture-ligated at its base using 2-0 Vicryl sutures  Excess hernia sac was excised and sent for pathology evaluation     If possible, the hernia sac was closed and a preperitoneal blunt dissection was carried out,  to place the patch in a Pre-Peritoneal  postion  A Ventralex mesh was placed into the defect and secured using interrupted 1 Prolene sutures in a circumferential fashion,  assuring there were no defects or gaps in the mesh  This was doubly checked to secure the mesh without any interrupting gaps or defects  The fascia was closed using a 4-1 technique using an 1 Prolene suture, incorporating the mesh in the fascia closure  The umbilicus was re-created using interrupted 2-0 Vicryl sutures  The subcutaneous tissues were closed using 2-0 and 3-0 Vicryl sutures and the skin closed using a 4-0 Monocryl subcuticular stitch  The wound was dressed, placing positive pressure in the umbilicus with a gauze pack  The wound was dressed with Steri-Strips and dressing  The patient tolerated the procedure well      A small Ventralex mesh was used in the repair  Instrument, sponge, and needle counts were correct prior to closure and at the conclusion of the case  Some portions of this records may have been generated with voice recognition software  There may be translation, syntax,  or grammatical errors  Occasional wrong word or "sound-a-like" substitutions may have occurred due to the inherent limitations of the voice recognition software  Read the chart carefully and recognize, using context, where substations may have occurred  If you have any questions, please contact the dictating provider for clarification or correction, as needed       Complications: None    Condition: Stable to PACU    SIGNATURE: Gladis Pineda MD   DATE: December 6, 2018   TIME: 9:23 AM

## 2018-12-06 NOTE — H&P (VIEW-ONLY)
Assessment/Plan:   Mitch Burnett is a 41 y o male who is here for The encounter diagnosis was Umbilical hernia without obstruction or gangrene  Patient is a known umbilical hernia and bulge which has progressively become more noticeable and symptomatic over the past 6 months  Plan: open repair of Umbilical Hernia  Preoperative Clearance: None      - Patient has been instructed to avoid aspirin containing medications or non-steroidal anti-inflammatory drugs for SEVEN days preceding surgery  Imaging:      _____________________________________________      HPI:  Mitch Burnett is a 41 y o male who was referred for evaluation of Pre-op Exam (update h&P for hernia surgery on 12/6/18)    Currently complaining of  persistent Umbilical Hernia  worse with bending, coughing, lifting, standing, walking,     no nausea and no vomiting,     regular bowel movement      Duration of pain or symptoms:  Intermittent and over 6 months      Prior abdominal surgery:   None      Lab Results   Component Value Date    WBC 6 91 08/04/2017    HGB 15 5 08/04/2017    HCT 45 3 08/04/2017    MCV 88 08/04/2017     09/01/2017     Lab Results   Component Value Date    K 4 2 08/04/2017     08/04/2017    CO2 27 08/04/2017    BUN 15 08/04/2017    CREATININE 1 16 08/04/2017    GLUF 100 (H) 04/08/2017    CALCIUM 9 6 08/04/2017    AST 32 04/03/2017    ALT 75 04/03/2017    ALKPHOS 66 04/03/2017    EGFR 79 08/04/2017     Lab Results   Component Value Date    INR 0 94 08/04/2017    PROTIME 12 6 08/04/2017           ROS:  General ROS: negative  negative for - chills, fatigue, fever or night sweats, weight loss  Respiratory ROS: no cough, shortness of breath, or wheezing  Cardiovascular ROS: no chest pain or dyspnea on exertion  Genito-Urinary ROS: no dysuria, trouble voiding, or hematuria  Musculoskeletal ROS: negative for - gait disturbance, joint pain or muscle pain  Neurological ROS: no TIA or stroke symptoms  Abdominal ROS: see HPI  GI ROS: see HPI  Skin ROS: no new rashes or lesions   Lymphatic ROS: no new adenopathy noted by pt  GYN ROS: see HPI, no new GYN history or bleeding noted  Psy ROS: no new mental or behavioral disturbances       Patient Active Problem List   Diagnosis    External hemorrhoids    Allergic rhinitis    Bilateral hearing loss    Arthritis of left knee    [facility]    Cervical spondylosis without myelopathy    Chronic low back pain    Depression    Follicular neoplasm of thyroid    Umbilical hernia    History of obstructive sleep apnea    KRISTIE (obstructive sleep apnea)    Umbilical hernia without obstruction or gangrene         Allergies: Patient has no known allergies      Meds:  Current Outpatient Prescriptions:     butalbital-acetaminophen-caffeine (FIORICET,ESGIC) -40 mg per tablet, Take 1 tablet by mouth every 4 (four) hours as needed for migraine  , Disp: , Rfl:     Multiple Vitamin (MULTI-VITAMIN DAILY) TABS, Take 1 tablet by mouth daily, Disp: , Rfl:     sertraline (ZOLOFT) 100 mg tablet, TAKE 1 TABLET BY MOUTH EVERY DAY, Disp: 90 tablet, Rfl: 2    PMH:  Past Medical History:   Diagnosis Date    Arthritis     [facility]     thyroid    Depression     Disease of thyroid gland     External hemorrhoids     last assessed 12/12/17    Headache     migraines    Hearing aid worn     bilat    Hemorrhoid     Obstructive sleep apnea     last assessed 6/7/16    Sleep apnea     had surgery for it    Tinnitus     Wears contact lenses     Wears glasses        PSH:  Past Surgical History:   Procedure Laterality Date    ADENOIDECTOMY      HEMORRHOID SURGERY N/A 11/29/2017    Procedure: HEMORRHOIDECTOMY EXCISION EXTERNAL AND INTERNAL;  Surgeon: Yokasta Chester MD;  Location: [facility];  Service: General    HERNIA REPAIR      KNEE SURGERY Left     [place] THYROID LOBECTOMY,UNILAT Right 8/31/2017    Procedure: HEMITHYROIDECTOMY; Surgeon: All Herman MD;  [facility];  Service: Surgical Oncology    REFRACTIVE SURGERY      THROAT SURGERY      for sleep apnea    TONSILLECTOMY      UVULOPALATOPHARYNGOPLASTY         Family History   Problem Relation Age of Onset   Cody Pert Stroke Mother     Depression Mother     Hypertension Mother     Depression Father     Melanoma Father     Parkinsonism Father     Bone cancer Maternal Grandmother     Prostate cancer Paternal Grandfather     Stroke Other     Depression Other         reports that he quit smoking about 4 years ago  His smoking use included Cigarettes  He has a 2 50 pack-year smoking history  He has never used smokeless tobacco  He reports that he drinks about 1 2 oz of alcohol per week   He reports that he does not use drugs  PHYSICAL EXAM  General Appearance:    Alert, cooperative, no distress, healthy   Head:    Normocephalic without obvious abnormality   Eyes:    PERRL, conjunctiva/corneas clear, EOM's intact        Neck:   Supple, no adenopathy, no JVD   Back:     Symmetric, no spinal or CVA tenderness   Lungs:     Clear to auscultation bilaterally, no wheezing or rhonchi   Heart:    Regular rate and rhythm, S1 and S2 normal, no murmur   Abdomen:      abdomen is soft without significant tenderness, masses, organomegaly or guarding Bowel sounds are normal     umbilical hernia  The hernia is, tender,   Extremities:   Extremities normal  No clubbing, cyanosis or edema   Psych:   Normal Affect, AOx3  Neurologic:  Skin:   CNII-XII intact  Strength symmetric, speech intact    Warm, dry, intact, no visible rashes or lesions                   Informed consent for procedure was personally discussed, reviewed, and signed by Dr Barry Teixeira  Discussion by Dr Barry Teixeira was carried out regarding risks, benefits, and alternatives with the patient   Risks include but are not limited to:  bleeding, infection, and delayed wound healing or an open wound, pulmonary embolus, leaks from bowel or bile ducts or other viscus, transfusions, death  Discussed in further detail the more common complications and their rates of occurrence   was used if necessary  Patient expressed understanding of the issues discussed and wished/consented to proceed  All questions were answered by Dr Delroy Goldman  Discussion performed between patient and the provider signing below  Signature:   Aimee Gomes MD    Date: 11/26/2018 Time: 10:03 AM       Some portions of this record may have been generated with voice recognition software  There may be translation, syntax,  or grammatical errors  Occasional wrong word or "sound-a-like" substitutions may have occurred due to the inherent limitations of the voice recognition software  Read the chart carefully and recognize, using context, where substitutions may have occurred  If you have any questions, please contact the dictating provider for clarification or correction, as needed  This encounter has been coded by a non-certified coder

## 2018-12-06 NOTE — ANESTHESIA PREPROCEDURE EVALUATION
Review of Systems/Medical History  Patient summary reviewed  Chart reviewed      Cardiovascular  Negative cardio ROS    Pulmonary  Sleep apnea CPAP,        GI/Hepatic  Negative GI/hepatic ROS          Negative  ROS        Endo/Other  History of thyroid disease ,   Comment: H/o thyroid ca    GYN       Hematology   Musculoskeletal    Arthritis     Neurology    Headaches,    Psychology   Depression , being treated for depression,              Physical Exam    Airway    Mallampati score: I  TM Distance: >3 FB  Neck ROM: full     Dental   No notable dental hx     Cardiovascular  Comment: Negative ROS, Rhythm: regular, Rate: normal,     Pulmonary  Breath sounds clear to auscultation,     Other Findings        Anesthesia Plan  ASA Score- 2     Anesthesia Type- general with ASA Monitors  Additional Monitors:   Airway Plan: ETT and LMA  Plan Factors-Patient not instructed to abstain from smoking on day of procedure  Patient did not smoke on day of surgery  Induction- intravenous  Postoperative Plan- Plan for postoperative opioid use  Informed Consent- Anesthetic plan and risks discussed with patient

## 2018-12-06 NOTE — DISCHARGE SUMMARY
Discharge Summary - German Romo 41 y o  male MRN: [id number]    Unit/Bed#: OR [place] Encounter: [id number]      Pre-Operative Diagnosis: Pre-Op Diagnosis Codes:     * Umbilical hernia without obstruction or gangrene [K42 9]    Post-Operative Diagnosis: Post-Op Diagnosis Codes:     * Umbilical hernia without obstruction or gangrene [K42 9]    Procedures Performed:  Procedure(s):  REPAIR HERNIA UMBILICAL  WITH    MESH    Surgeon: Rambo Dalal MD    See H & P for full details of admission and Operative Note for full details of operations performed  Patient was seen and examined prior to discharge  Provisions for Follow-Up Care:  See After Visit Summary for information related to follow-up care and home orders  Disposition: Home, in stable condition  Planned Readmission: No    Discharge Medications:  See after visit summary for reconciled discharge medications provided to patient and family  Post Operative instructions: Reviewed with patient and/or family  Some portions of this record may have been generated with voice recognition software  There may be translation, syntax,  or grammatical errors  Occasional wrong word or "sound-a-like" substitutions may have occurred due to the inherent limitations of the voice recognition software  Read the chart carefully and recognize, using context, where substitutions may have occurred  If you have any questions, please contact the dictating provider for clarification or correction, as needed  This encounter has been coded by non certified Coder      Signature:   Rambo Dalal MD  Date: 12/6/2018 Time: 9:24 AM

## 2018-12-06 NOTE — DISCHARGE INSTRUCTIONS
[facility] Instructions  Dr Mare Brittle MD, FACS    1  General: You will feel pulling sensations around the wound or funny aches and pains around the incisions  This is normal  Even minor surgery is a change in your body and this is your bodys way of reaction to it  If you have had abdominal surgery, it may help to support the incision with a small pillow or blanket for comfort when moving or coughing  2  Wound care: Make sure to remove the bandage in about 24 hours, unless instructed otherwise  You usually don't have to redress the wound after 24-48 hours, unless for comfort  Keep the incision clean and dry  Let air get to it  If this Steri-Strips fall off, just keep the wound clean  3  Water: You may shower over the wound, unless there are drain tubes left in place  Do not bathe or use a pool or hot tub until cleared by the physician  You may shower right over the staples or Steri-Strips and packing dry when you are done  4  Activity: You may go up and down stairs, walk as much as you are comfortable, but walk at least 3 times each day  If you have had abdominal surgery, do not lift anything heavier than 15 pounds for at least 2-4 weeks, unless cleared by the doctor  5  Diet: You may resume a regular diet  If you had a same-day surgery or overnight stay surgery, you may wish to eat lightly for a few days: soups, crackers, and sandwiches  You may resume a regular diet when ready  6  Medications: Resume all of your previous medications, unless told otherwise by the doctor  Avoid aspirin or ibuprofen (Advil, Motrin, etc ) products for 2-3 days after the date of surgery  You may, at that time, began to take them again  Tylenol is always fine, unless you are taking any narcotic pain medication containing Tylenol (such as Percocet, Darvocet, Vicodin, or anything containing acetaminophen)  Do not take Tylenol if you're taking these medications   You do not need to take the narcotic pain medications unless you are having significant pain and discomfort  7  Driving: You will need someone to drive you home on the day of surgery  Do not drive or make any important decisions while on narcotic pain medication or 24 hours and after anesthesia or sedation for surgery  Generally, you may drive when your off all narcotic pain medications  8  Upset Stomach: You may take Maalox, Tums, or similar items for an upset stomach  If your narcotic pain medication causes an upset stomach, do not take it on an empty stomach  Try taking it with at least some crackers or toast      9  Constipation: Patients often experienced constipation after surgery  You may take over-the-counter medication for this, such as Metamucil, Senokot, Dulcolax, milk of magnesia, etc  You may take a suppository unless you have had anorectal surgery such as a procedure on your hemorrhoids  If you experience significant nausea or vomiting after abdominal surgery, call the office before trying any of these medications  10  Call the office: If you are experiencing any of the following, fevers above 101 5°, significant nausea or vomiting, if the wound develops drainage and/or is excessive redness around the wound, or if you have significant diarrhea or other worsening symptoms  11  Pain: You may be given a prescription for pain  This will be given to the hospital, the day of surgery  12  Sexual Activity: You may resume sexual activity when you feel ready and comfortable and your incision is sealed and healed without apparent infection risk  13  Urination: If you haven't urinated in 6 hours, go directly to the ER for evaluation for urinary retention       [address], [address]  [place], [address]                                                                                                                     Phone: [phone]

## 2018-12-07 ENCOUNTER — TELEPHONE (OUTPATIENT)
Dept: SURGERY | Facility: CLINIC | Age: 43
End: 2018-12-07

## 2018-12-18 ENCOUNTER — OFFICE VISIT (OUTPATIENT)
Dept: SURGERY | Facility: CLINIC | Age: 43
End: 2018-12-18

## 2018-12-18 VITALS
WEIGHT: 230 LBS | HEIGHT: 72 IN | RESPIRATION RATE: 16 BRPM | HEART RATE: 87 BPM | TEMPERATURE: 97.2 F | BODY MASS INDEX: 31.15 KG/M2

## 2018-12-18 DIAGNOSIS — K42.9 UMBILICAL HERNIA WITHOUT OBSTRUCTION OR GANGRENE: Primary | ICD-10-CM

## 2018-12-18 PROCEDURE — 99024 POSTOP FOLLOW-UP VISIT: CPT | Performed by: PHYSICIAN ASSISTANT

## 2018-12-18 NOTE — PROGRESS NOTES
Assessment/Plan:   Carmen Fishman is a 41 y o male who comes in today for postoperative check after  Open umbilical hernia repair on 12/6/18   Doing well without complaints  Pathology: None sent    Postoperative restrictions reviewed, including specific lifting and exercise restrictions  All questions answered  ______________________________________________________  HPI:  Carmen Fishman is a 41 y o male who comes in today for postoperative check after recent open umbilical hernia repair on 12/6/18   Currently doing well without problems, no fever or chills,no nausea and no vomiting  Reports minimal pain, tolerating diet having regular bowel movements       ROS:  General ROS: negative for - chills, fatigue, fever or night sweats, weight loss  Respiratory ROS: no cough, shortness of breath, or wheezing  Cardiovascular ROS: no chest pain or dyspnea on exertion  Genito-Urinary ROS: no dysuria, trouble voiding, or hematuria  Musculoskeletal ROS: negative for - gait disturbance, joint pain or muscle pain  Neurological ROS: no TIA or stroke symptoms  GI ROS: see HPI  Skin ROS: no new rashes or lesions   Lymphatic ROS: no new adenopathy noted by pt  GYN ROS: see HPI, no new GYN history or bleeding noted  Psy ROS: no new mental or behavioral disturbances       Patient Active Problem List   Diagnosis    External hemorrhoids    Allergic rhinitis    Bilateral hearing loss    Arthritis of left knee    Cavernous hemangioma of brain ([address] )    Cervical spondylosis without myelopathy    Chronic low back pain    Depression    Follicular neoplasm of thyroid    Umbilical hernia    History of obstructive sleep apnea    KRISTIE (obstructive sleep apnea)    Umbilical hernia without obstruction or gangrene         Patient has no known allergies        Current Outpatient Prescriptions:     butalbital-acetaminophen-caffeine (FIORICET,ESGIC) -40 mg per tablet, Take 1 tablet by mouth every 4 (four) hours as needed for migraine  , Disp: , Rfl:     Multiple Vitamin (MULTI-VITAMIN DAILY) TABS, Take 1 tablet by mouth daily, Disp: , Rfl:     sertraline (ZOLOFT) 100 mg tablet, TAKE 1 TABLET BY MOUTH EVERY DAY (Patient taking differently: TAKE 1 TABLET BY MOUTH EVERYEvening), Disp: 90 tablet, Rfl: 2    docusate sodium (COLACE) 100 mg capsule, Take 1 capsule (100 mg total) by mouth 2 (two) times a day for 30 days (Patient not taking: Reported on 12/18/2018 ), Disp: 60 capsule, Rfl: 0    HYDROcodone-acetaminophen (NORCO) 5-325 mg per tablet, Take 1-2 tablets by mouth every 4 (four) hours as needed for pain for up to 30 doses Max Daily Amount: 12 tablets (Patient not taking: Reported on 12/18/2018 ), Disp: 30 tablet, Rfl: 0    ondansetron (ZOFRAN) 4 mg tablet, Take 1 tablet (4 mg total) by mouth every 8 (eight) hours as needed for nausea or vomiting for up to 7 days, Disp: 20 tablet, Rfl: 0    Past Medical History:   Diagnosis Date    Arthritis     neck    [facility]     thyroid-thyroidectomy    Depression     Disease of thyroid gland     Headache     migraines    Hearing aid worn     bilat    Obstructive sleep apnea     last assessed 6/7/16    Sleep apnea     had surgery for it    Tinnitus     Umbilical hernia     Wears contact lenses     Wears glasses        Past Surgical History:   Procedure Laterality Date    ADENOIDECTOMY      HEMORRHOID SURGERY N/A 11/29/2017    Procedure: HEMORRHOIDECTOMY EXCISION EXTERNAL AND INTERNAL;  Surgeon: Laure Tirado MD;  [facility];  Service: General    HERNIA REPAIR      KNEE SURGERY Left     MS REPAIR UMBILICAL TZNR,0+O/N,CEFYG N/A 12/6/2018    Procedure: REPAIR HERNIA UMBILICAL  WITH    MESH;  Surgeon: Laure Tirado MD;  [facility];  Service: General    [place] THYROID LOBECTOMY,UNILAT Right 8/31/2017    Procedure: HEMITHYROIDECTOMY;  Surgeon: Anuj English MD;  [facility];  Service: Surgical Oncology    REFRACTIVE SURGERY  THROAT SURGERY      for sleep apnea    TONSILLECTOMY      UVULOPALATOPHARYNGOPLASTY         Family History   Problem Relation Age of Onset   Susan Nine Stroke Mother     Depression Mother     Hypertension Mother     Depression Father     Melanoma Father     Parkinsonism Father     Bone cancer Maternal Grandmother     Prostate cancer Paternal Grandfather     Stroke Other     Depression Other         reports that he quit smoking about 4 years ago  His smoking use included Cigarettes  He has a 2 50 pack-year smoking history  He has never used smokeless tobacco  He reports that he drinks about 1 2 oz of alcohol per week   He reports that he does not use drugs  PHYSICAL EXAM  General: normal, cooperative, no distress  Abdominal: soft, nondistended or nontender  Incision: clean, dry, and intact and healing well      Some portions of this record may have been generated with voice recognition software  There may be translation, syntax,  or grammatical errors  Occasional wrong word or "sound-a-like" substitutions may have occurred due to the inherent limitations of the voice recognition software  Read the chart carefully and recognize, using context, where substitutions may have occurred  If you have any questions, please contact the dictating provider for clarification or correction, as needed  This encounter has been coded by a non-certified coder         Lucho Villasenor PA-C    Date: 12/18/2018 Time: 8:31 AM

## 2018-12-18 NOTE — LETTER
December 18, 2018     Carin Krishnan MD  [id number]   [address] [facility] [place]    Patient: Evangelina Aldana   YOB: 1975   Date of Visit: 12/18/2018       Dear Dr Leia Garza: Thank you for referring Pieter Evangelista to me for evaluation  Below are my notes for this consultation  If you have questions, please do not hesitate to call me  I look forward to following your patient along with you  Sincerely,        Saravanan Fregoso PA-C        CC: No Recipients  Cherry Gunter  12/18/2018  8:53 AM  Sign at close encounter  Assessment/Plan:   Evangelina Aldana is a 41 y o male who comes in today for postoperative check after  Open umbilical hernia repair on 12/6/18   Doing well without complaints  Pathology: None sent    Postoperative restrictions reviewed, including specific lifting and exercise restrictions  All questions answered  ______________________________________________________  HPI:  Evangelina Aldana is a 41 y o male who comes in today for postoperative check after recent  on   Currently doing well without problems, no fever or chills,no nausea and no vomiting  Reports minimal pain, tolerating diet having regular bowel movements       ROS:  General ROS: negative for - chills, fatigue, fever or night sweats, weight loss  Respiratory ROS: no cough, shortness of breath, or wheezing  Cardiovascular ROS: no chest pain or dyspnea on exertion  Genito-Urinary ROS: no dysuria, trouble voiding, or hematuria  Musculoskeletal ROS: negative for - gait disturbance, joint pain or muscle pain  Neurological ROS: no TIA or stroke symptoms  GI ROS: see HPI  Skin ROS: no new rashes or lesions   Lymphatic ROS: no new adenopathy noted by pt     GYN ROS: see HPI, no new GYN history or bleeding noted  Psy ROS: no new mental or behavioral disturbances       Patient Active Problem List   Diagnosis    External hemorrhoids    Allergic rhinitis    Bilateral hearing loss    Arthritis of left knee    Cavernous hemangioma of brain (HCC)    Cervical spondylosis without myelopathy    Chronic low back pain    Depression    Follicular neoplasm of thyroid    Umbilical hernia    History of obstructive sleep apnea    KRISTIE (obstructive sleep apnea)    Umbilical hernia without obstruction or gangrene         Patient has no known allergies        Current Outpatient Prescriptions:     butalbital-acetaminophen-caffeine (FIORICET,ESGIC) -40 mg per tablet, Take 1 tablet by mouth every 4 (four) hours as needed for migraine  , Disp: , Rfl:     Multiple Vitamin (MULTI-VITAMIN DAILY) TABS, Take 1 tablet by mouth daily, Disp: , Rfl:     sertraline (ZOLOFT) 100 mg tablet, TAKE 1 TABLET BY MOUTH EVERY DAY (Patient taking differently: TAKE 1 TABLET BY MOUTH EVERYEvening), Disp: 90 tablet, Rfl: 2    docusate sodium (COLACE) 100 mg capsule, Take 1 capsule (100 mg total) by mouth 2 (two) times a day for 30 days (Patient not taking: Reported on 12/18/2018 ), Disp: 60 capsule, Rfl: 0    HYDROcodone-acetaminophen (NORCO) 5-325 mg per tablet, Take 1-2 tablets by mouth every 4 (four) hours as needed for pain for up to 30 doses Max Daily Amount: 12 tablets (Patient not taking: Reported on 12/18/2018 ), Disp: 30 tablet, Rfl: 0    ondansetron (ZOFRAN) 4 mg tablet, Take 1 tablet (4 mg total) by mouth every 8 (eight) hours as needed for nausea or vomiting for up to 7 days, Disp: 20 tablet, Rfl: 0    Past Medical History:   Diagnosis Date    Arthritis     neck    Cancer ([facility] [address] )     thyroid-thyroidectomy    Depression     Disease of thyroid gland     Headache     migraines    Hearing aid worn     bilat    Obstructive sleep apnea     last assessed 6/7/16    Sleep apnea     had surgery for it    Tinnitus     Umbilical hernia     Wears contact lenses     Wears glasses        Past Surgical History:   Procedure Laterality Date    ADENOIDECTOMY      HEMORRHOID SURGERY N/A 11/29/2017 Procedure: HEMORRHOIDECTOMY EXCISION EXTERNAL AND INTERNAL;  Surgeon: Nikolay Treviño MD;  Location: [facility];  Service: General    HERNIA REPAIR      KNEE SURGERY Left     CT REPAIR UMBILICAL ZXFF,0+Q/A,JILXE N/A 12/6/2018    Procedure: REPAIR HERNIA UMBILICAL  WITH    MESH;  Surgeon: Nikolay Treviño MD;  Location: [facility];  Service: General    CT THYROID LOBECTOMY,UNILAT Right 8/31/2017    Procedure: HEMITHYROIDECTOMY;  Surgeon: Abhi Edwards MD;  [facility];  Service: Surgical Oncology    REFRACTIVE SURGERY      THROAT SURGERY      for sleep apnea    TONSILLECTOMY      UVULOPALATOPHARYNGOPLASTY         Family History   Problem Relation Age of Onset   Carol Diones Stroke Mother     Depression Mother     Hypertension Mother     Depression Father     Melanoma Father     Parkinsonism Father     Bone cancer Maternal Grandmother     Prostate cancer Paternal Grandfather     Stroke Other     Depression Other         reports that he quit smoking about 4 years ago  His smoking use included Cigarettes  He has a 2 50 pack-year smoking history  He has never used smokeless tobacco  He reports that he drinks about 1 2 oz of alcohol per week   He reports that he does not use drugs  PHYSICAL EXAM  General: normal, cooperative, no distress  Abdominal: soft, nondistended or nontender  Incision: clean, dry, and intact and healing well      Some portions of this record may have been generated with voice recognition software  There may be translation, syntax,  or grammatical errors  Occasional wrong word or "sound-a-like" substitutions may have occurred due to the inherent limitations of the voice recognition software  Read the chart carefully and recognize, using context, where substitutions may have occurred  If you have any questions, please contact the dictating provider for clarification or correction, as needed  This encounter has been coded by a non-certified coder         Manish Patient, HAO    Date: 12/18/2018 Time: 8:31 AM

## 2019-04-01 ENCOUNTER — TELEPHONE (OUTPATIENT)
Dept: INTERNAL MEDICINE CLINIC | Facility: CLINIC | Age: 44
End: 2019-04-01

## 2019-04-01 DIAGNOSIS — D49.7 FOLLICULAR NEOPLASM OF THYROID: Primary | ICD-10-CM

## 2019-04-01 DIAGNOSIS — F33.9 EPISODE OF RECURRENT MAJOR DEPRESSIVE DISORDER, UNSPECIFIED DEPRESSION EPISODE SEVERITY (HCC): Primary | ICD-10-CM

## 2019-04-01 DIAGNOSIS — D49.7 FOLLICULAR NEOPLASM OF THYROID: ICD-10-CM

## 2019-04-02 ENCOUNTER — APPOINTMENT (OUTPATIENT)
Dept: LAB | Age: 44
End: 2019-04-02
Payer: COMMERCIAL

## 2019-04-02 DIAGNOSIS — F33.9 EPISODE OF RECURRENT MAJOR DEPRESSIVE DISORDER, UNSPECIFIED DEPRESSION EPISODE SEVERITY (HCC): ICD-10-CM

## 2019-04-02 DIAGNOSIS — D49.7 FOLLICULAR NEOPLASM OF THYROID: ICD-10-CM

## 2019-04-02 LAB
ALBUMIN SERPL BCP-MCNC: 4.1 G/DL (ref 3.5–5)
ALP SERPL-CCNC: 54 U/L (ref 46–116)
ALT SERPL W P-5'-P-CCNC: 35 U/L (ref 12–78)
ANION GAP SERPL CALCULATED.3IONS-SCNC: 6 MMOL/L (ref 4–13)
AST SERPL W P-5'-P-CCNC: 21 U/L (ref 5–45)
BASOPHILS # BLD AUTO: 0.03 THOUSANDS/ΜL (ref 0–0.1)
BASOPHILS NFR BLD AUTO: 1 % (ref 0–1)
BILIRUB SERPL-MCNC: 0.83 MG/DL (ref 0.2–1)
BUN SERPL-MCNC: 14 MG/DL (ref 5–25)
CALCIUM SERPL-MCNC: 9.1 MG/DL (ref 8.3–10.1)
CHLORIDE SERPL-SCNC: 104 MMOL/L (ref 100–108)
CHOLEST SERPL-MCNC: 207 MG/DL (ref 50–200)
CO2 SERPL-SCNC: 28 MMOL/L (ref 21–32)
CREAT SERPL-MCNC: 1.13 MG/DL (ref 0.6–1.3)
EOSINOPHIL # BLD AUTO: 0.12 THOUSAND/ΜL (ref 0–0.61)
EOSINOPHIL NFR BLD AUTO: 2 % (ref 0–6)
ERYTHROCYTE [DISTWIDTH] IN BLOOD BY AUTOMATED COUNT: 12.4 % (ref 11.6–15.1)
GFR SERPL CREATININE-BSD FRML MDRD: 80 ML/MIN/1.73SQ M
GLUCOSE P FAST SERPL-MCNC: 100 MG/DL (ref 65–99)
HCT VFR BLD AUTO: 46.6 % (ref 36.5–49.3)
HDLC SERPL-MCNC: 45 MG/DL (ref 40–60)
HGB BLD-MCNC: 15.3 G/DL (ref 12–17)
IMM GRANULOCYTES # BLD AUTO: 0.02 THOUSAND/UL (ref 0–0.2)
IMM GRANULOCYTES NFR BLD AUTO: 0 % (ref 0–2)
LDLC SERPL CALC-MCNC: 103 MG/DL (ref 0–100)
LYMPHOCYTES # BLD AUTO: 1.24 THOUSANDS/ΜL (ref 0.6–4.47)
LYMPHOCYTES NFR BLD AUTO: 24 % (ref 14–44)
MCH RBC QN AUTO: 29.1 PG (ref 26.8–34.3)
MCHC RBC AUTO-ENTMCNC: 32.8 G/DL (ref 31.4–37.4)
MCV RBC AUTO: 89 FL (ref 82–98)
MONOCYTES # BLD AUTO: 0.3 THOUSAND/ΜL (ref 0.17–1.22)
MONOCYTES NFR BLD AUTO: 6 % (ref 4–12)
NEUTROPHILS # BLD AUTO: 3.37 THOUSANDS/ΜL (ref 1.85–7.62)
NEUTS SEG NFR BLD AUTO: 67 % (ref 43–75)
NONHDLC SERPL-MCNC: 162 MG/DL
NRBC BLD AUTO-RTO: 0 /100 WBCS
PLATELET # BLD AUTO: 252 THOUSANDS/UL (ref 149–390)
PMV BLD AUTO: 8.9 FL (ref 8.9–12.7)
POTASSIUM SERPL-SCNC: 4.5 MMOL/L (ref 3.5–5.3)
PROT SERPL-MCNC: 7.3 G/DL (ref 6.4–8.2)
RBC # BLD AUTO: 5.25 MILLION/UL (ref 3.88–5.62)
SODIUM SERPL-SCNC: 138 MMOL/L (ref 136–145)
TRIGL SERPL-MCNC: 295 MG/DL
TSH SERPL DL<=0.05 MIU/L-ACNC: 3.73 UIU/ML (ref 0.36–3.74)
WBC # BLD AUTO: 5.08 THOUSAND/UL (ref 4.31–10.16)

## 2019-04-02 PROCEDURE — 84443 ASSAY THYROID STIM HORMONE: CPT

## 2019-04-02 PROCEDURE — 80061 LIPID PANEL: CPT

## 2019-04-02 PROCEDURE — 36415 COLL VENOUS BLD VENIPUNCTURE: CPT

## 2019-04-02 PROCEDURE — 85025 COMPLETE CBC W/AUTO DIFF WBC: CPT

## 2019-04-02 PROCEDURE — 80053 COMPREHEN METABOLIC PANEL: CPT

## 2019-04-08 ENCOUNTER — OFFICE VISIT (OUTPATIENT)
Dept: INTERNAL MEDICINE CLINIC | Facility: CLINIC | Age: 44
End: 2019-04-08
Payer: COMMERCIAL

## 2019-04-08 VITALS
WEIGHT: 239.4 LBS | HEART RATE: 59 BPM | OXYGEN SATURATION: 93 % | SYSTOLIC BLOOD PRESSURE: 122 MMHG | DIASTOLIC BLOOD PRESSURE: 68 MMHG | HEIGHT: 72 IN | TEMPERATURE: 98.8 F | BODY MASS INDEX: 32.43 KG/M2

## 2019-04-08 DIAGNOSIS — D18.02 CAVERNOUS HEMANGIOMA OF BRAIN (HCC): ICD-10-CM

## 2019-04-08 DIAGNOSIS — Z80.8 FAMILY HISTORY OF MELANOMA: ICD-10-CM

## 2019-04-08 DIAGNOSIS — E78.2 ELEVATED CHOLESTEROL WITH ELEVATED TRIGLYCERIDES: ICD-10-CM

## 2019-04-08 DIAGNOSIS — R63.4 WEIGHT REDUCTION: ICD-10-CM

## 2019-04-08 DIAGNOSIS — E66.09 CLASS 1 OBESITY DUE TO EXCESS CALORIES WITH SERIOUS COMORBIDITY AND BODY MASS INDEX (BMI) OF 32.0 TO 32.9 IN ADULT: ICD-10-CM

## 2019-04-08 DIAGNOSIS — Z86.69 HISTORY OF OBSTRUCTIVE SLEEP APNEA: ICD-10-CM

## 2019-04-08 DIAGNOSIS — G47.33 OSA (OBSTRUCTIVE SLEEP APNEA): ICD-10-CM

## 2019-04-08 DIAGNOSIS — Z00.00 WELL ADULT EXAM: Primary | ICD-10-CM

## 2019-04-08 PROBLEM — D49.7 FOLLICULAR NEOPLASM OF THYROID: Status: RESOLVED | Noted: 2017-07-26 | Resolved: 2019-04-08

## 2019-04-08 PROBLEM — K42.9 UMBILICAL HERNIA: Status: RESOLVED | Noted: 2017-04-07 | Resolved: 2019-04-08

## 2019-04-08 PROBLEM — E66.811 CLASS 1 OBESITY DUE TO EXCESS CALORIES IN ADULT: Status: ACTIVE | Noted: 2019-04-08

## 2019-04-08 PROCEDURE — 99396 PREV VISIT EST AGE 40-64: CPT | Performed by: INTERNAL MEDICINE

## 2019-05-22 ENCOUNTER — CLINICAL SUPPORT (OUTPATIENT)
Dept: NUTRITION | Facility: HOSPITAL | Age: 44
End: 2019-05-22
Payer: COMMERCIAL

## 2019-05-22 VITALS — BODY MASS INDEX: 30.65 KG/M2 | HEIGHT: 72 IN | WEIGHT: 226.3 LBS

## 2019-05-22 DIAGNOSIS — R63.4 WEIGHT REDUCTION: ICD-10-CM

## 2019-05-22 DIAGNOSIS — E78.2 ELEVATED CHOLESTEROL WITH ELEVATED TRIGLYCERIDES: ICD-10-CM

## 2019-05-22 PROCEDURE — 97802 MEDICAL NUTRITION INDIV IN: CPT

## 2019-06-26 ENCOUNTER — CLINICAL SUPPORT (OUTPATIENT)
Dept: NUTRITION | Facility: HOSPITAL | Age: 44
End: 2019-06-26
Payer: COMMERCIAL

## 2019-06-26 VITALS — WEIGHT: 227.2 LBS | HEIGHT: 72 IN | BODY MASS INDEX: 30.77 KG/M2

## 2019-06-26 DIAGNOSIS — E66.09 CLASS 1 OBESITY DUE TO EXCESS CALORIES WITH SERIOUS COMORBIDITY AND BODY MASS INDEX (BMI) OF 31.0 TO 31.9 IN ADULT: ICD-10-CM

## 2019-06-26 PROCEDURE — 97803 MED NUTRITION INDIV SUBSEQ: CPT

## 2019-07-11 DIAGNOSIS — F32.A DEPRESSION, UNSPECIFIED DEPRESSION TYPE: ICD-10-CM

## 2019-07-11 RX ORDER — SERTRALINE HYDROCHLORIDE 100 MG/1
TABLET, FILM COATED ORAL
Qty: 90 TABLET | Refills: 2 | Status: SHIPPED | OUTPATIENT
Start: 2019-07-11 | End: 2021-06-07 | Stop reason: SDUPTHER

## 2020-01-17 ENCOUNTER — APPOINTMENT (OUTPATIENT)
Dept: LAB | Age: 45
End: 2020-01-17
Payer: COMMERCIAL

## 2020-01-17 ENCOUNTER — TRANSCRIBE ORDERS (OUTPATIENT)
Dept: ADMINISTRATIVE | Age: 45
End: 2020-01-17

## 2020-01-17 DIAGNOSIS — M54.2 CERVICALGIA: ICD-10-CM

## 2020-01-17 DIAGNOSIS — M54.6 PAIN IN THORACIC SPINE: ICD-10-CM

## 2020-01-17 DIAGNOSIS — M54.6 PAIN IN THORACIC SPINE: Primary | ICD-10-CM

## 2020-01-17 LAB
ALBUMIN SERPL BCP-MCNC: 4.2 G/DL (ref 3.5–5)
ALP SERPL-CCNC: 52 U/L (ref 46–116)
ALT SERPL W P-5'-P-CCNC: 44 U/L (ref 12–78)
ANION GAP SERPL CALCULATED.3IONS-SCNC: 5 MMOL/L (ref 4–13)
AST SERPL W P-5'-P-CCNC: 14 U/L (ref 5–45)
BASOPHILS # BLD AUTO: 0.04 THOUSANDS/ΜL (ref 0–0.1)
BASOPHILS NFR BLD AUTO: 0 % (ref 0–1)
BILIRUB SERPL-MCNC: 0.59 MG/DL (ref 0.2–1)
BUN SERPL-MCNC: 19 MG/DL (ref 5–25)
CALCIUM SERPL-MCNC: 9.1 MG/DL (ref 8.3–10.1)
CHLORIDE SERPL-SCNC: 106 MMOL/L (ref 100–108)
CO2 SERPL-SCNC: 29 MMOL/L (ref 21–32)
CREAT SERPL-MCNC: 1 MG/DL (ref 0.6–1.3)
CRP SERPL QL: <3 MG/L
EOSINOPHIL # BLD AUTO: 0.01 THOUSAND/ΜL (ref 0–0.61)
EOSINOPHIL NFR BLD AUTO: 0 % (ref 0–6)
ERYTHROCYTE [DISTWIDTH] IN BLOOD BY AUTOMATED COUNT: 12.2 % (ref 11.6–15.1)
ERYTHROCYTE [SEDIMENTATION RATE] IN BLOOD: 7 MM/HOUR (ref 0–10)
GFR SERPL CREATININE-BSD FRML MDRD: 92 ML/MIN/1.73SQ M
GLUCOSE SERPL-MCNC: 91 MG/DL (ref 65–140)
HCT VFR BLD AUTO: 44 % (ref 36.5–49.3)
HGB BLD-MCNC: 14.2 G/DL (ref 12–17)
IMM GRANULOCYTES # BLD AUTO: 0.06 THOUSAND/UL (ref 0–0.2)
IMM GRANULOCYTES NFR BLD AUTO: 1 % (ref 0–2)
LYMPHOCYTES # BLD AUTO: 1.8 THOUSANDS/ΜL (ref 0.6–4.47)
LYMPHOCYTES NFR BLD AUTO: 16 % (ref 14–44)
MCH RBC QN AUTO: 29.6 PG (ref 26.8–34.3)
MCHC RBC AUTO-ENTMCNC: 32.3 G/DL (ref 31.4–37.4)
MCV RBC AUTO: 92 FL (ref 82–98)
MONOCYTES # BLD AUTO: 0.59 THOUSAND/ΜL (ref 0.17–1.22)
MONOCYTES NFR BLD AUTO: 5 % (ref 4–12)
NEUTROPHILS # BLD AUTO: 8.89 THOUSANDS/ΜL (ref 1.85–7.62)
NEUTS SEG NFR BLD AUTO: 78 % (ref 43–75)
NRBC BLD AUTO-RTO: 0 /100 WBCS
PLATELET # BLD AUTO: 280 THOUSANDS/UL (ref 149–390)
PMV BLD AUTO: 9.3 FL (ref 8.9–12.7)
POTASSIUM SERPL-SCNC: 4.3 MMOL/L (ref 3.5–5.3)
PROT SERPL-MCNC: 7.6 G/DL (ref 6.4–8.2)
RBC # BLD AUTO: 4.79 MILLION/UL (ref 3.88–5.62)
SODIUM SERPL-SCNC: 140 MMOL/L (ref 136–145)
WBC # BLD AUTO: 11.39 THOUSAND/UL (ref 4.31–10.16)

## 2020-01-17 PROCEDURE — 36415 COLL VENOUS BLD VENIPUNCTURE: CPT

## 2020-01-17 PROCEDURE — 84165 PROTEIN E-PHORESIS SERUM: CPT | Performed by: PATHOLOGY

## 2020-01-17 PROCEDURE — 80053 COMPREHEN METABOLIC PANEL: CPT

## 2020-01-17 PROCEDURE — 86140 C-REACTIVE PROTEIN: CPT

## 2020-01-17 PROCEDURE — 84165 PROTEIN E-PHORESIS SERUM: CPT

## 2020-01-17 PROCEDURE — 85025 COMPLETE CBC W/AUTO DIFF WBC: CPT

## 2020-01-17 PROCEDURE — 85652 RBC SED RATE AUTOMATED: CPT

## 2020-01-21 LAB
ALBUMIN SERPL ELPH-MCNC: 4.79 G/DL (ref 3.5–5)
ALBUMIN SERPL ELPH-MCNC: 66.5 % (ref 52–65)
ALPHA1 GLOB SERPL ELPH-MCNC: 0.28 G/DL (ref 0.1–0.4)
ALPHA1 GLOB SERPL ELPH-MCNC: 3.9 % (ref 2.5–5)
ALPHA2 GLOB SERPL ELPH-MCNC: 0.67 G/DL (ref 0.4–1.2)
ALPHA2 GLOB SERPL ELPH-MCNC: 9.3 % (ref 7–13)
BETA GLOB ABNORMAL SERPL ELPH-MCNC: 0.4 G/DL (ref 0.4–0.8)
BETA1 GLOB SERPL ELPH-MCNC: 5.6 % (ref 5–13)
BETA2 GLOB SERPL ELPH-MCNC: 4.3 % (ref 2–8)
BETA2+GAMMA GLOB SERPL ELPH-MCNC: 0.31 G/DL (ref 0.2–0.5)
GAMMA GLOB ABNORMAL SERPL ELPH-MCNC: 0.75 G/DL (ref 0.5–1.6)
GAMMA GLOB SERPL ELPH-MCNC: 10.4 % (ref 12–22)
IGG/ALB SER: 1.99 {RATIO} (ref 1.1–1.8)
PROT PATTERN SERPL ELPH-IMP: ABNORMAL
PROT SERPL-MCNC: 7.2 G/DL (ref 6.4–8.2)

## 2020-04-14 ENCOUNTER — TELEMEDICINE (OUTPATIENT)
Dept: INTERNAL MEDICINE CLINIC | Facility: CLINIC | Age: 45
End: 2020-04-14
Payer: COMMERCIAL

## 2020-04-14 DIAGNOSIS — R06.00 DOE (DYSPNEA ON EXERTION): ICD-10-CM

## 2020-04-14 DIAGNOSIS — G47.33 OSA (OBSTRUCTIVE SLEEP APNEA): ICD-10-CM

## 2020-04-14 DIAGNOSIS — Z20.828 EXPOSURE TO SARS-ASSOCIATED CORONAVIRUS: Primary | ICD-10-CM

## 2020-04-14 DIAGNOSIS — Z20.828 EXPOSURE TO SARS-ASSOCIATED CORONAVIRUS: ICD-10-CM

## 2020-04-14 PROBLEM — R06.09 DOE (DYSPNEA ON EXERTION): Status: ACTIVE | Noted: 2020-04-14

## 2020-04-14 PROCEDURE — 99442 PR PHYS/QHP TELEPHONE EVALUATION 11-20 MIN: CPT | Performed by: INTERNAL MEDICINE

## 2020-04-14 PROCEDURE — 87635 SARS-COV-2 COVID-19 AMP PRB: CPT

## 2020-04-15 LAB — SARS-COV-2 RNA SPEC QL NAA+PROBE: DETECTED

## 2020-04-16 ENCOUNTER — HOSPITAL ENCOUNTER (EMERGENCY)
Facility: HOSPITAL | Age: 45
Discharge: HOME | End: 2020-04-16
Attending: EMERGENCY MEDICINE | Admitting: EMERGENCY MEDICINE
Payer: COMMERCIAL

## 2020-04-16 ENCOUNTER — APPOINTMENT (EMERGENCY)
Dept: RADIOLOGY | Facility: HOSPITAL | Age: 45
End: 2020-04-16
Payer: COMMERCIAL

## 2020-04-16 VITALS
OXYGEN SATURATION: 99 % | WEIGHT: 226.63 LBS | HEART RATE: 74 BPM | DIASTOLIC BLOOD PRESSURE: 78 MMHG | BODY MASS INDEX: 30.74 KG/M2 | RESPIRATION RATE: 16 BRPM | TEMPERATURE: 98.1 F | SYSTOLIC BLOOD PRESSURE: 132 MMHG

## 2020-04-16 DIAGNOSIS — U07.1 COVID-19 VIRUS INFECTION: Primary | ICD-10-CM

## 2020-04-16 DIAGNOSIS — R06.00 DYSPNEA: ICD-10-CM

## 2020-04-16 LAB
ANION GAP SERPL CALCULATED.3IONS-SCNC: 12 MMOL/L (ref 4–13)
ATRIAL RATE: 80 BPM
BASOPHILS # BLD AUTO: 0.01 THOUSANDS/ΜL (ref 0–0.1)
BASOPHILS NFR BLD AUTO: 0 % (ref 0–1)
BUN SERPL-MCNC: 13 MG/DL (ref 5–25)
CALCIUM SERPL-MCNC: 9.2 MG/DL (ref 8.3–10.1)
CHLORIDE SERPL-SCNC: 101 MMOL/L (ref 100–108)
CO2 SERPL-SCNC: 24 MMOL/L (ref 21–32)
CREAT SERPL-MCNC: 1.07 MG/DL (ref 0.6–1.3)
EOSINOPHIL # BLD AUTO: 0.01 THOUSAND/ΜL (ref 0–0.61)
EOSINOPHIL NFR BLD AUTO: 0 % (ref 0–6)
ERYTHROCYTE [DISTWIDTH] IN BLOOD BY AUTOMATED COUNT: 12.5 % (ref 11.6–15.1)
GFR SERPL CREATININE-BSD FRML MDRD: 85 ML/MIN/1.73SQ M
GLUCOSE SERPL-MCNC: 109 MG/DL (ref 65–140)
HCT VFR BLD AUTO: 49.3 % (ref 36.5–49.3)
HGB BLD-MCNC: 16.1 G/DL (ref 12–17)
IMM GRANULOCYTES # BLD AUTO: 0.02 THOUSAND/UL (ref 0–0.2)
IMM GRANULOCYTES NFR BLD AUTO: 1 % (ref 0–2)
LYMPHOCYTES # BLD AUTO: 1.07 THOUSANDS/ΜL (ref 0.6–4.47)
LYMPHOCYTES NFR BLD AUTO: 27 % (ref 14–44)
MAGNESIUM SERPL-MCNC: 2.3 MG/DL (ref 1.6–2.6)
MCH RBC QN AUTO: 29.6 PG (ref 26.8–34.3)
MCHC RBC AUTO-ENTMCNC: 32.7 G/DL (ref 31.4–37.4)
MCV RBC AUTO: 91 FL (ref 82–98)
MONOCYTES # BLD AUTO: 0.34 THOUSAND/ΜL (ref 0.17–1.22)
MONOCYTES NFR BLD AUTO: 9 % (ref 4–12)
NEUTROPHILS # BLD AUTO: 2.51 THOUSANDS/ΜL (ref 1.85–7.62)
NEUTS SEG NFR BLD AUTO: 63 % (ref 43–75)
NRBC BLD AUTO-RTO: 0 /100 WBCS
P AXIS: 75 DEGREES
PLATELET # BLD AUTO: 210 THOUSANDS/UL (ref 149–390)
PMV BLD AUTO: 8.7 FL (ref 8.9–12.7)
POTASSIUM SERPL-SCNC: 3.7 MMOL/L (ref 3.5–5.3)
PR INTERVAL: 146 MS
QRS AXIS: 63 DEGREES
QRSD INTERVAL: 90 MS
QT INTERVAL: 364 MS
QTC INTERVAL: 419 MS
RBC # BLD AUTO: 5.44 MILLION/UL (ref 3.88–5.62)
SODIUM SERPL-SCNC: 137 MMOL/L (ref 136–145)
T WAVE AXIS: 43 DEGREES
TROPONIN I SERPL-MCNC: <0.02 NG/ML
VENTRICULAR RATE: 80 BPM
WBC # BLD AUTO: 3.96 THOUSAND/UL (ref 4.31–10.16)

## 2020-04-16 PROCEDURE — 84484 ASSAY OF TROPONIN QUANT: CPT | Performed by: EMERGENCY MEDICINE

## 2020-04-16 PROCEDURE — 99285 EMERGENCY DEPT VISIT HI MDM: CPT

## 2020-04-16 PROCEDURE — 93005 ELECTROCARDIOGRAM TRACING: CPT

## 2020-04-16 PROCEDURE — 83735 ASSAY OF MAGNESIUM: CPT | Performed by: EMERGENCY MEDICINE

## 2020-04-16 PROCEDURE — 93010 ELECTROCARDIOGRAM REPORT: CPT | Performed by: INTERNAL MEDICINE

## 2020-04-16 PROCEDURE — 80048 BASIC METABOLIC PNL TOTAL CA: CPT | Performed by: EMERGENCY MEDICINE

## 2020-04-16 PROCEDURE — 36415 COLL VENOUS BLD VENIPUNCTURE: CPT | Performed by: EMERGENCY MEDICINE

## 2020-04-16 PROCEDURE — 71045 X-RAY EXAM CHEST 1 VIEW: CPT

## 2020-04-16 PROCEDURE — 99284 EMERGENCY DEPT VISIT MOD MDM: CPT | Performed by: EMERGENCY MEDICINE

## 2020-04-16 PROCEDURE — 85025 COMPLETE CBC W/AUTO DIFF WBC: CPT | Performed by: EMERGENCY MEDICINE

## 2021-03-12 NOTE — TELEPHONE ENCOUNTER
Called and spoke to patient  Post-op Umbilical hernia repair on 12/6/18  Patient states he is doing good  Denies any N/V/F/C, eating and drinking ok  No BM yet, will use stool softeners daily, will contact office if this becomes a problems  Instructed to keep incision dry and clean and to wear binder at all times other than showering  Patient has post-op ov on 12/18  He will contact office sooner with any questions/problems  Health Maintenance Due   Topic Date Due   • Shingles Vaccine (1 of 2) Never done   • Hepatitis C Screening  Never done   • Medicare Wellness Visit  Never done   • Breast Cancer Screening  07/08/2020   • Influenza Vaccine (1) 08/01/2020       Patient is due for topics as listed above but is not proceeding with Immunization(s) Influenza and Shingles at this time. Education provided for Immunization(s) Influenza and Shingles, Mammogram and MWV (Medicare Wellness Visit).

## 2021-04-08 DIAGNOSIS — Z23 ENCOUNTER FOR IMMUNIZATION: ICD-10-CM

## 2021-06-01 DIAGNOSIS — G47.33 OSA (OBSTRUCTIVE SLEEP APNEA): Primary | ICD-10-CM

## 2021-06-01 DIAGNOSIS — Z86.39 HISTORY OF THYROID NODULE: ICD-10-CM

## 2021-06-01 DIAGNOSIS — Z13.220 SCREENING FOR HYPERLIPIDEMIA: ICD-10-CM

## 2021-06-01 DIAGNOSIS — Z13.1 SCREENING FOR DIABETES MELLITUS: ICD-10-CM

## 2021-06-03 ENCOUNTER — APPOINTMENT (OUTPATIENT)
Dept: LAB | Facility: MEDICAL CENTER | Age: 46
End: 2021-06-03
Payer: COMMERCIAL

## 2021-06-03 DIAGNOSIS — Z13.1 SCREENING FOR DIABETES MELLITUS: ICD-10-CM

## 2021-06-03 LAB
ALBUMIN SERPL BCP-MCNC: 4 G/DL (ref 3.5–5)
ALP SERPL-CCNC: 56 U/L (ref 46–116)
ALT SERPL W P-5'-P-CCNC: 46 U/L (ref 12–78)
ANION GAP SERPL CALCULATED.3IONS-SCNC: 3 MMOL/L (ref 4–13)
AST SERPL W P-5'-P-CCNC: 19 U/L (ref 5–45)
BASOPHILS # BLD AUTO: 0.06 THOUSANDS/ΜL (ref 0–0.1)
BASOPHILS NFR BLD AUTO: 1 % (ref 0–1)
BILIRUB SERPL-MCNC: 0.64 MG/DL (ref 0.2–1)
BUN SERPL-MCNC: 16 MG/DL (ref 5–25)
CALCIUM SERPL-MCNC: 9.3 MG/DL (ref 8.3–10.1)
CHLORIDE SERPL-SCNC: 106 MMOL/L (ref 100–108)
CHOLEST SERPL-MCNC: 214 MG/DL (ref 50–200)
CO2 SERPL-SCNC: 30 MMOL/L (ref 21–32)
CREAT SERPL-MCNC: 0.95 MG/DL (ref 0.6–1.3)
EOSINOPHIL # BLD AUTO: 0.23 THOUSAND/ΜL (ref 0–0.61)
EOSINOPHIL NFR BLD AUTO: 4 % (ref 0–6)
ERYTHROCYTE [DISTWIDTH] IN BLOOD BY AUTOMATED COUNT: 12.6 % (ref 11.6–15.1)
GFR SERPL CREATININE-BSD FRML MDRD: 98 ML/MIN/1.73SQ M
GLUCOSE P FAST SERPL-MCNC: 113 MG/DL (ref 65–99)
HCT VFR BLD AUTO: 46.3 % (ref 36.5–49.3)
HDLC SERPL-MCNC: 45 MG/DL
HGB BLD-MCNC: 15.4 G/DL (ref 12–17)
IMM GRANULOCYTES # BLD AUTO: 0.02 THOUSAND/UL (ref 0–0.2)
IMM GRANULOCYTES NFR BLD AUTO: 0 % (ref 0–2)
LDLC SERPL CALC-MCNC: 118 MG/DL (ref 0–100)
LYMPHOCYTES # BLD AUTO: 1.71 THOUSANDS/ΜL (ref 0.6–4.47)
LYMPHOCYTES NFR BLD AUTO: 26 % (ref 14–44)
MCH RBC QN AUTO: 29.7 PG (ref 26.8–34.3)
MCHC RBC AUTO-ENTMCNC: 33.3 G/DL (ref 31.4–37.4)
MCV RBC AUTO: 89 FL (ref 82–98)
MONOCYTES # BLD AUTO: 0.5 THOUSAND/ΜL (ref 0.17–1.22)
MONOCYTES NFR BLD AUTO: 8 % (ref 4–12)
NEUTROPHILS # BLD AUTO: 4.13 THOUSANDS/ΜL (ref 1.85–7.62)
NEUTS SEG NFR BLD AUTO: 61 % (ref 43–75)
NONHDLC SERPL-MCNC: 169 MG/DL
NRBC BLD AUTO-RTO: 0 /100 WBCS
PLATELET # BLD AUTO: 273 THOUSANDS/UL (ref 149–390)
PMV BLD AUTO: 8.7 FL (ref 8.9–12.7)
POTASSIUM SERPL-SCNC: 4.8 MMOL/L (ref 3.5–5.3)
PROT SERPL-MCNC: 7.1 G/DL (ref 6.4–8.2)
RBC # BLD AUTO: 5.19 MILLION/UL (ref 3.88–5.62)
SODIUM SERPL-SCNC: 139 MMOL/L (ref 136–145)
TRIGL SERPL-MCNC: 254 MG/DL
TSH SERPL DL<=0.05 MIU/L-ACNC: 2.39 UIU/ML (ref 0.36–3.74)
WBC # BLD AUTO: 6.65 THOUSAND/UL (ref 4.31–10.16)

## 2021-06-03 PROCEDURE — 84443 ASSAY THYROID STIM HORMONE: CPT | Performed by: INTERNAL MEDICINE

## 2021-06-03 PROCEDURE — 80053 COMPREHEN METABOLIC PANEL: CPT | Performed by: INTERNAL MEDICINE

## 2021-06-03 PROCEDURE — 36415 COLL VENOUS BLD VENIPUNCTURE: CPT | Performed by: INTERNAL MEDICINE

## 2021-06-03 PROCEDURE — 85025 COMPLETE CBC W/AUTO DIFF WBC: CPT | Performed by: INTERNAL MEDICINE

## 2021-06-03 PROCEDURE — 80061 LIPID PANEL: CPT

## 2021-06-07 ENCOUNTER — OFFICE VISIT (OUTPATIENT)
Dept: INTERNAL MEDICINE CLINIC | Facility: CLINIC | Age: 46
End: 2021-06-07
Payer: COMMERCIAL

## 2021-06-07 VITALS
WEIGHT: 236.6 LBS | HEART RATE: 66 BPM | RESPIRATION RATE: 14 BRPM | BODY MASS INDEX: 32.05 KG/M2 | SYSTOLIC BLOOD PRESSURE: 110 MMHG | TEMPERATURE: 97.8 F | OXYGEN SATURATION: 99 % | HEIGHT: 72 IN | DIASTOLIC BLOOD PRESSURE: 80 MMHG

## 2021-06-07 DIAGNOSIS — R73.09 ELEVATED GLUCOSE: Primary | ICD-10-CM

## 2021-06-07 DIAGNOSIS — Z85.850 HISTORY OF THYROID CANCER: ICD-10-CM

## 2021-06-07 DIAGNOSIS — E78.2 MIXED HYPERLIPIDEMIA: ICD-10-CM

## 2021-06-07 DIAGNOSIS — G47.33 OSA (OBSTRUCTIVE SLEEP APNEA): ICD-10-CM

## 2021-06-07 DIAGNOSIS — F32.A DEPRESSION, UNSPECIFIED DEPRESSION TYPE: ICD-10-CM

## 2021-06-07 PROBLEM — G43.109 MIGRAINE WITH AURA AND WITHOUT STATUS MIGRAINOSUS, NOT INTRACTABLE: Status: ACTIVE | Noted: 2021-06-07

## 2021-06-07 PROCEDURE — 99396 PREV VISIT EST AGE 40-64: CPT | Performed by: INTERNAL MEDICINE

## 2021-06-07 RX ORDER — SERTRALINE HYDROCHLORIDE 100 MG/1
100 TABLET, FILM COATED ORAL DAILY
Qty: 90 TABLET | Refills: 3 | Status: SHIPPED | OUTPATIENT
Start: 2021-06-07 | End: 2022-06-27

## 2021-06-07 NOTE — PROGRESS NOTES
INTERNAL MEDICINE OFFICE VISIT       NAME: Mitch Burnett  AGE: 39 y o  SEX: male       : 1975        MRN: [id number]    DATE: 2021  TIME: 8:40 AM    Assessment and Plan   1  Elevated glucose  -     Basic metabolic panel  -     Hemoglobin A1C    2  Mixed hyperlipidemia  -     Lipid panel; Future    3  History of thyroid cancer    4  Depression, unspecified depression type  -     sertraline (ZOLOFT) 100 mg tablet; Take 1 tablet (100 mg total) by mouth daily    5  KRISTIE (obstructive sleep apnea)       Finn Melendez is doing very well and medically complete recovery from his severe neuropathic pain after his C7 cervical disc replacement performed last year at the [facility]  We discussed mild elevated glucose, mixed hyperlipidemia, and opportunities to improve his diet  He is well educated about diet changes needed and has been making changes since he has been able to work from home more often recently  Thyroid cancer history:  He has been released from active follow-up by Oncology as he had definitive surgery for stage I follicular thyroid cancer several years ago and thyroid function tests are normal   Plan is to continue to monitor clinically  Finn Melendez is euthyroid without replacement  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 which was renewed today  There are no symptoms of sleep apnea and CBC does not demonstrate any polycythemia  Plan is continue current treatment  Opportunities to improve diet and lose weight were discussed  Next checkup will be in 1 year by problem sooner and was encouraged sooner as needed  Chief Complaint     Checkup    History of Present Illness   Mitch Burnett is a 39y o -year-old male who is here for checkup  Right is well known to me and is continuing care  He did follow me to my practice at [place]  He has recovered well from his C7 disc replacement with resolution of all neuropathic pain        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, including team building and solid complex problems  He enjoys his work and fortunately is working near home right now because of a new facility under construction  This has got him off the road work he was not eating well over last few months and gained weight and was any right chronic food  We reviewed his recent lab work reflecting some elevated blood glucose, certainly not diabetic range, and we reviewed his mixed hyperlipidemia, his weight, the impact of improving lifestyle on his health, and he is going to make changes in his diet, work on losing weight and we will check lab work as ordered today 3 months  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 which was renewed  Also, his life is going well as his wife is doing well, children doing well, and work is also optimal       There are no symptoms of obstructive sleep apnea  He is in long-term remission with history of removal of stage I follicular thyroid cancer years ago and has been released from follow-up  TSH is normal     There are no new problems reported  Review of Systems   Review of Systems as above, all others negative      Active Problem List     Patient Active Problem List   Diagnosis    External hemorrhoids    Allergic rhinitis    Bilateral hearing loss    Arthritis of left knee    Cavernous hemangioma of brain ([facility] [address] )    Cervical spondylosis without myelopathy    Chronic low back pain    Depression    Elevated glucose    KRISTIE (obstructive sleep apnea)    Class 1 obesity due to excess calories in adult    Family history of melanoma    History of thyroid cancer    Mixed hyperlipidemia    Migraine with aura and without status migrainosus, not intractable       The following portions of the patient's history were reviewed and updated as appropriate: allergies, current medications, past family history, past medical history, past social history, past surgical history, and problem list     Objective     Vitals:    06/07/21 0817   BP: 110/80   Pulse: 66   Resp: 14   Temp: 97 8 °F (36 6 °C)   SpO2: 99%     Wt Readings from Last 3 Encounters:   06/07/21 107 kg (236 lb 9 6 oz)   04/16/20 103 kg (226 lb 10 1 oz)   06/26/19 103 kg (227 lb 3 2 oz)       Physical Exam   Vital signs stable, very pleasant gentleman no distress who appears physically healthy overall  HEENT shows no active disease  Neck:  Well-healed scar at the base of the neck anteriorly consistent with previous surgery  There is full range of motion the neck without pain and there is no deformity  Skin and hair appear normal in overall appearance is euthyroid  Cardiac exam is normal on auscultation  Lungs are clear  Circulation intact    No skin abnormalities on limited exam     Pertinent Laboratory/Diagnostic Studies:  I have reviewed pertinent labs:  CBC:   Lab Results   Component Value Date    WBC 6 65 06/03/2021    RBC 5 19 06/03/2021    HGB 15 4 06/03/2021    HCT 46 3 06/03/2021    MCV 89 06/03/2021     06/03/2021    MCH 29 7 06/03/2021    MCHC 33 3 06/03/2021    RDW 12 6 06/03/2021    MPV 8 7 (L) 06/03/2021    NEUTROABS 4 13 06/03/2021     CMP:   Lab Results   Component Value Date    SODIUM 139 06/03/2021    K 4 8 06/03/2021     06/03/2021    CO2 30 06/03/2021    AGAP 3 (L) 06/03/2021    BUN 16 06/03/2021    CREATININE 0 95 06/03/2021    GLUC 109 04/16/2020    GLUF 113 (H) 06/03/2021    CALCIUM 9 3 06/03/2021    AST 19 06/03/2021    ALT 46 06/03/2021    ALKPHOS 56 06/03/2021    TP 7 1 06/03/2021    ALB 4 0 06/03/2021    TBILI 0 64 06/03/2021    EGFR 98 06/03/2021     Lipid Profile:   Lab Results   Component Value Date    CHOLESTEROL 214 (H) 06/03/2021    HDL 45 06/03/2021    TRIG 254 (H) 06/03/2021    LDLCALC 118 (H) 06/03/2021    [place] 169 06/03/2021     Thyroid Studies:   Lab Results   Component Value Date    HKK7HOIECVSF 2 390 06/03/2021    FREET4 0 83 03/10/2018    U8QXKXM 0 70 03/10/2018         Orders Placed This Encounter   Procedures    Basic metabolic panel    Hemoglobin A1C    Lipid panel       ALLERGIES:  No Known Allergies    Current Medications     Current Outpatient Medications   Medication Sig Dispense Refill    butalbital-acetaminophen-caffeine (FIORICET,ESGIC) -40 mg per tablet Take 1 tablet by mouth every 4 (four) hours as needed for migraine        Multiple Vitamin (MULTI-VITAMIN DAILY) TABS Take 1 tablet by mouth daily      sertraline (ZOLOFT) 100 mg tablet Take 1 tablet (100 mg total) by mouth daily 90 tablet 3     No current facility-administered medications for this visit            Ronnell Andino MD

## 2021-07-20 ENCOUNTER — TELEPHONE (OUTPATIENT)
Dept: INTERNAL MEDICINE CLINIC | Facility: CLINIC | Age: 46
End: 2021-07-20

## 2021-07-20 NOTE — TELEPHONE ENCOUNTER
Pt's wife Fani Larson left a voicemail in regards to encounter from 6/7/21 with Dr Atwood  Office visit notes from that encounter are indicative for a physical exam but it was coded as a 200  Patient has a $162 96 balance for the appointment  Fani Larson would appreciate a call back from the supervisor to address the issue  She can be reached back at [phone]      Thank you

## 2022-02-14 DIAGNOSIS — F32.A DEPRESSION, UNSPECIFIED DEPRESSION TYPE: Primary | ICD-10-CM

## 2022-06-23 NOTE — MISCELLANEOUS
Message   Recorded as Task   Date: 12/04/2017 09:11 AM, Created By: Alexia Taveras   Task Name: Follow Up   Assigned To: Giovana uDnn   Regarding Patient: Hali Barahona, Status: Active   CommentDianne Guthrie - 04 Dec 2017 9:11 AM     TASK CREATED  Pt calling c/o pain around incision site  States he was feeling pretty good Saturday and then yesterday the pain has gotten worse  He has used his pain meds which helped but is asking if this is something to be expected  Afebrile, no bleeding and states his wife did not see anything at the site  Concerns with the increase in pain  Is moving bowels with the aid of laxatives and softners  Is out of pain meds at this time  Pls advise   Alexia Taveras - 04 Dec 2017 3:15 PM     TASK EDITED  Spoke w/ Dr Stephanie Casanova and offered pt appt, feels things are ok at this time and we can order more pain meds if pt wants  Pt declined appt and does want pain meds  Tramadol called into [facility] and pt aware  Pt will call office with further concerns or questions as needed  Active Problems    1  Allergic rhinitis (477 9) (J30 9)   2  Arthritis of left knee (716 96) (M17 12)   3  Bilateral hearing loss (389 9) (H91 93)   4  Blood glucose elevated (790 29) (R73 9)   5  Cavernous hemangioma of brain (228 02) (D18 02)   6  Cervical spondylosis without myelopathy (721 0) (M47 812)   7  Chronic low back pain (724 2,338 29) (M54 5,G89 29)   8  Chronic pain of left knee (941 29,166 53) (M25 562,G89 29)   9  Concussion syndrome (310 2) (F07 81)   10  Depression (311) (F32 9)   11  Elevated glucose (790 29) (R73 09)   12  External hemorrhoids (455 3) (K64 4)   13  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)   14  Hemorrhage of anus and rectum (569 3) (K62 5)   15  Thyroid nodule (241 0) (E04 1)   16  Umbilical hernia (363 9) (K42 9)   17  Venous hemangioma (228 00) (D18 00)   18  Worsening headaches (784 0) (R51)    Current Meds   1   Butalbital-APAP-Caffeine -40 MG Oral Capsule; [facility] - Telemetry (Hospital)  Initial Discharge Assessment       Primary Care Provider: Gumaro Del Real MD    Admission Diagnosis: Meningitis [G03.9]    Admission Date: 6/21/2022  Expected Discharge Date:     Discharge Barriers Identified: None    Payor: [employer] / Plan: [employer] / Product Type: Commercial /     Extended Emergency Contact Information  Primary Emergency Contact: Tati Pennington   [facility]  Home Phone: [phone]  Mobile Phone: [phone]  Relation: Mother    Discharge Plan A: Home with family         [facility] - [address]  [address]  [place] [address]  Phone: [phone] Fax: [phone]      Initial Assessment (most recent)     Adult Discharge Assessment - 06/23/22 0932        Discharge Assessment    Assessment Type Discharge Planning Assessment     Source of Information health record     When was your last doctors appointment? 04/18/22     Reason For Admission r/o meningitis     Lives With significant other;child(neto), dependent     Prior to hospitilization cognitive status: Alert/Oriented     Current cognitive status: Alert/Oriented     Walking or Climbing Stairs Difficulty none     Dressing/Bathing Difficulty none     Equipment Currently Used at Home none     Readmission within 30 days? No     Patient currently being followed by outpatient case management? No     Do you currently have service(s) that help you manage your care at home? No     Do you take prescription medications? Yes     Do you have prescription coverage? Yes     Coverage Medicaid     Do you have any problems affording any of your prescribed medications? No     Is the patient taking medications as prescribed? yes     Who is going to help you get home at discharge? family     How do you get to doctors appointments? car, drives self     Are you on dialysis? No     Do you take coumadin? No     Discharge Plan A Home with family     DME  Needed Upon Discharge  none     Discharge Barriers Identified None        Relationship/Environment    Name(s) of Who Lives With Patient Significant other and 16 year old son                 Patient did not answer hospital phone.  No answer on cell phone.  Left message to call yesterday.  No return phone call received.  Medical record review done for assessment.         TAKE 1 CAPSULE EVERY 4   HOURS AS NEEDED; Therapy: 57LAQ5677 to (Evaluate:53Txj0063); Last Rx:[id number] Ordered   2  Excedrin Extra Strength TABS; TAKE 1 TABLET 3 TIMES DAILY AS NEEDED; Therapy: (Recorded:09Esn4445) to Recorded   3  Lidocaine HCl - 2 % External Gel; APPLY AS DIRECTED; Therapy: 03DXS8018 to (Last Rx:17Nov2017)  Requested for: 44IIT4753 Ordered   4  Multi-Vitamin TABS; TAKE 1 TABLET DAILY; Therapy: (Recorded:10Oct2017) to Recorded   5  Sertraline HCl - 100 MG Oral Tablet; take 1 tablet by mouth every day; Therapy: 80MLE1423 to (Evaluate:26Jan2018)  Requested for: 02BAZ0787; Last   LH:[id number] Ordered    Allergies    1  No Known Drug Allergies    2  No Known Environmental Allergies   3   No Known Food Allergies    Signatures   Electronically signed by : Que Bowman, ; Dec  4 2017  3:15PM EST                       (Author)

## 2022-06-24 ENCOUNTER — APPOINTMENT (OUTPATIENT)
Dept: RADIOLOGY | Facility: MEDICAL CENTER | Age: 47
End: 2022-06-24
Payer: COMMERCIAL

## 2022-06-24 ENCOUNTER — OFFICE VISIT (OUTPATIENT)
Dept: URGENT CARE | Facility: MEDICAL CENTER | Age: 47
End: 2022-06-24
Payer: COMMERCIAL

## 2022-06-24 VITALS
DIASTOLIC BLOOD PRESSURE: 89 MMHG | TEMPERATURE: 99.1 F | SYSTOLIC BLOOD PRESSURE: 142 MMHG | RESPIRATION RATE: 18 BRPM | OXYGEN SATURATION: 98 % | HEART RATE: 77 BPM

## 2022-06-24 DIAGNOSIS — S93.401A SPRAIN OF RIGHT ANKLE, UNSPECIFIED LIGAMENT, INITIAL ENCOUNTER: Primary | ICD-10-CM

## 2022-06-24 DIAGNOSIS — M25.571 RIGHT ANKLE PAIN, UNSPECIFIED CHRONICITY: ICD-10-CM

## 2022-06-24 PROCEDURE — 73610 X-RAY EXAM OF ANKLE: CPT

## 2022-06-24 PROCEDURE — 73630 X-RAY EXAM OF FOOT: CPT

## 2022-06-24 PROCEDURE — 99213 OFFICE O/P EST LOW 20 MIN: CPT | Performed by: EMERGENCY MEDICINE

## 2022-06-24 NOTE — PATIENT INSTRUCTIONS
Ankle Sprain   WHAT YOU NEED TO KNOW:   An ankle sprain happens when 1 or more ligaments in your ankle joint stretch or tear  Ligaments are tough tissues that connect bones  Ligaments support your joints and keep your bones in place  DISCHARGE INSTRUCTIONS:   Return to the emergency department if:   You have severe pain in your ankle  Your foot or toes are cold or numb  Your ankle becomes more weak or unstable (wobbly)  You are unable to put any weight on your ankle or foot  Your swelling has increased or returned  Call your doctor if:   Your pain does not go away, even after treatment  You have questions or concerns about your condition or care  Medicines: You may need any of the following:  NSAIDs , such as ibuprofen, help decrease swelling, pain, and fever  This medicine is available with or without a doctor's order  NSAIDs can cause stomach bleeding or kidney problems in certain people  If you take blood thinner medicine, always ask your healthcare provider if NSAIDs are safe for you  Always read the medicine label and follow directions  Acetaminophen  decreases pain and fever  It is available without a doctor's order  Ask how much to take and how often to take it  Follow directions  Read the labels of all other medicines you are using to see if they also contain acetaminophen, or ask your doctor or pharmacist  Acetaminophen can cause liver damage if not taken correctly  Do not use more than 4 grams (4,000 milligrams) total of acetaminophen in one day  Prescription pain medicine  may be given  Ask your healthcare provider how to take this medicine safely  Some prescription pain medicines contain acetaminophen  Do not take other medicines that contain acetaminophen without talking to your healthcare provider  Too much acetaminophen may cause liver damage  Prescription pain medicine may cause constipation  Ask your healthcare provider how to prevent or treat constipation       Take your medicine as directed  Contact your healthcare provider if you think your medicine is not helping or if you have side effects  Tell him or her if you are allergic to any medicine  Keep a list of the medicines, vitamins, and herbs you take  Include the amounts, and when and why you take them  Bring the list or the pill bottles to follow-up visits  Carry your medicine list with you in case of an emergency  Self-care:   Use support devices,  such as a brace, cast, or splint, to limit your movement and protect your joint  You may need to use crutches to decrease your pain as you move around  Go to physical therapy as directed  A physical therapist teaches you exercises to help improve movement and strength, and to decrease pain  Rest  your ankle so that it can heal  Return to normal activities as directed  Apply ice  on your ankle for 15 to 20 minutes every hour or as directed  Use an ice pack, or put crushed ice in a plastic bag  Cover it with a towel  Ice helps prevent tissue damage and decreases swelling and pain  Compress  your ankle  Ask if you should wrap an elastic bandage around your injured ligament  An elastic bandage provides support and helps decrease swelling and movement so your joint can heal  Wear as long as directed  Elevate  your ankle above the level of your heart as often as you can  This will help decrease swelling and pain  Prop your ankle on pillows or blankets to keep it elevated comfortably  Prevent another ankle sprain:   Let your ankle heal   Find out how long your ligament needs to heal  Do not do any physical activity until your healthcare provider says it is okay  If you start activity too soon, you may develop a more serious injury  Always warm up and stretch  before you exercise or play sports  Use the right equipment  Always wear shoes that fit well and are made for the activity that you are doing   You may also need ankle supports, elbow and knee pads, or braces  Follow up with your doctor as directed:  Write down your questions so you remember to ask them during your visits  © Copyright [employer] 2022 Information is for End User's use only and may not be sold, redistributed or otherwise used for commercial purposes  All illustrations and images included in CareNotes® are the copyrighted property of A D A M , Inc  or Suni Reyleonard Melchor   The above information is an  only  It is not intended as medical advice for individual conditions or treatments  Talk to your doctor, nurse or pharmacist before following any medical regimen to see if it is safe and effective for you  Avulsion Fracture   WHAT YOU NEED TO KNOW:   An avulsion fracture is when a small piece of bone breaks and pulls away from a larger bone  Part or all of the piece may break away  DISCHARGE INSTRUCTIONS:   Call your local emergency number (911 in the [facility]) if:   You feel lightheaded, short of breath, and have chest pain  You cough up blood  Return to the emergency department if:   Your leg feels warm, tender, and painful  It may look swollen and red  Your cast cracks or is damaged  The pain in your injured limb gets worse even after you rest and take medicine  The skin, toes, or fingers of your injured limb become swollen, cold, or blue  Call your doctor if:   You have numbness or tingling in your hand or foot below your cast     You cannot move your fingers or toes below the cast      You have new sores or redness around your cast or splint  You have new or worsening trouble moving your injured limb  You have questions or concerns about your condition or care  Medicines: You may need any of the following:  Prescription pain medicine  may be given  Ask your healthcare provider how to take this medicine safely  Some prescription pain medicines contain acetaminophen   Do not take other medicines that contain acetaminophen without talking to your healthcare provider  Too much acetaminophen may cause liver damage  Prescription pain medicine may cause constipation  Ask your healthcare provider how to prevent or treat constipation  NSAIDs , such as ibuprofen, help decrease swelling, pain, and fever  This medicine is available with or without a doctor's order  NSAIDs can cause stomach bleeding or kidney problems in certain people  If you take blood thinner medicine, always ask your healthcare provider if NSAIDs are safe for you  Always read the medicine label and follow directions  Take your medicine as directed  Contact your healthcare provider if you think your medicine is not helping or if you have side effects  Tell him or her if you are allergic to any medicine  Keep a list of the medicines, vitamins, and herbs you take  Include the amounts, and when and why you take them  Bring the list or the pill bottles to follow-up visits  Carry your medicine list with you in case of an emergency  Self-care:   Limit activity as directed  Get plenty of rest while your fracture heals  When the pain decreases, begin normal, slow movements  Slowly start to do more each day  Rest when you feel it is needed  Apply ice  on your injury for 15 to 20 minutes every hour or as directed  Use an ice pack, or put crushed ice in a plastic bag  Cover it with a towel  Ice helps prevent tissue damage and decreases swelling and pain  Elevate  your injured limb above the level of your heart as often as you can  This will help decrease swelling and pain  Prop your injured limb on pillows or blankets to keep it elevated comfortably  Use support devices as directed  You may need to use crutches or a walker until your fracture heals  Ask for more information about how to use these walking devices if needed  Bathing with a cast or splint: If you have a cast or splint, it is important not to get it wet  Before bathing, cover the cast or splint with a plastic bag   Tape the bag to your skin above the cast or splint to seal out the water  Hold your arm or leg away from the water in case the bag leaks  Ask when it is okay to take a bath or shower  Cast or splint care:   Check the skin around the cast or splint every day  Do not push down or lean on any part of the cast or splint because it may break  Do not use a sharp or pointed object to scratch your skin under the cast or splint  Physical therapy:  A physical therapist may teach you exercises to strengthen your injured limb once the pain is gone  Follow up with your doctor as directed: You will need to return for more tests to see how well your fracture is healing  Write down your questions so you remember to ask them during your visits  © Copyright [employer] 2022 Information is for End User's use only and may not be sold, redistributed or otherwise used for commercial purposes  All illustrations and images included in CareNotes® are the copyrighted property of A D A M , Inc  or [facility] Matt Roche   The above information is an  only  It is not intended as medical advice for individual conditions or treatments  Talk to your doctor, nurse or pharmacist before following any medical regimen to see if it is safe and effective for you

## 2022-06-24 NOTE — PROGRESS NOTES
[facility]        NAME: Librado Thomas is a 42 y o  male  : 1975    MRN: [id number]  DATE: 2022  TIME: 7:25 PM    Assessment and Plan   Sprain of right ankle, unspecified ligament, initial encounter [S93 401A]  1  Sprain of right ankle, unspecified ligament, initial encounter  XR ankle 3+ vw right    XR foot 3+ vw right    Cam Boot    Crutches    Ambulatory Referral to Orthopedic Surgery     7:25 PM  Xray does not show an obvious fracture by my read  There are some irregularities at the tip of the medial and lateral malleoli  He is not tender in this area but more over the anterior ligaments of the ankle  Suspect sprain but cannot rule out avulsion fracture  Will place in cam boot and on crutches to follow up with ortho  Patient Instructions   Ankle Sprain   WHAT YOU NEED TO KNOW:   An ankle sprain happens when 1 or more ligaments in your ankle joint stretch or tear  Ligaments are tough tissues that connect bones  Ligaments support your joints and keep your bones in place  DISCHARGE INSTRUCTIONS:   Return to the emergency department if:   · You have severe pain in your ankle  · Your foot or toes are cold or numb  · Your ankle becomes more weak or unstable (wobbly)  · You are unable to put any weight on your ankle or foot  · Your swelling has increased or returned  Call your doctor if:   · Your pain does not go away, even after treatment  · You have questions or concerns about your condition or care  Medicines: You may need any of the following:  · NSAIDs , such as ibuprofen, help decrease swelling, pain, and fever  This medicine is available with or without a doctor's order  NSAIDs can cause stomach bleeding or kidney problems in certain people  If you take blood thinner medicine, always ask your healthcare provider if NSAIDs are safe for you  Always read the medicine label and follow directions  · Acetaminophen  decreases pain and fever   It is available without a doctor's order  Ask how much to take and how often to take it  Follow directions  Read the labels of all other medicines you are using to see if they also contain acetaminophen, or ask your doctor or pharmacist  Acetaminophen can cause liver damage if not taken correctly  Do not use more than 4 grams (4,000 milligrams) total of acetaminophen in one day  · Prescription pain medicine  may be given  Ask your healthcare provider how to take this medicine safely  Some prescription pain medicines contain acetaminophen  Do not take other medicines that contain acetaminophen without talking to your healthcare provider  Too much acetaminophen may cause liver damage  Prescription pain medicine may cause constipation  Ask your healthcare provider how to prevent or treat constipation  · Take your medicine as directed  Contact your healthcare provider if you think your medicine is not helping or if you have side effects  Tell him or her if you are allergic to any medicine  Keep a list of the medicines, vitamins, and herbs you take  Include the amounts, and when and why you take them  Bring the list or the pill bottles to follow-up visits  Carry your medicine list with you in case of an emergency  Self-care:   · Use support devices,  such as a brace, cast, or splint, to limit your movement and protect your joint  You may need to use crutches to decrease your pain as you move around  · Go to physical therapy as directed  A physical therapist teaches you exercises to help improve movement and strength, and to decrease pain  · Rest  your ankle so that it can heal  Return to normal activities as directed  · Apply ice  on your ankle for 15 to 20 minutes every hour or as directed  Use an ice pack, or put crushed ice in a plastic bag  Cover it with a towel  Ice helps prevent tissue damage and decreases swelling and pain  · Compress  your ankle   Ask if you should wrap an elastic bandage around your injured ligament  An elastic bandage provides support and helps decrease swelling and movement so your joint can heal  Wear as long as directed  · Elevate  your ankle above the level of your heart as often as you can  This will help decrease swelling and pain  Prop your ankle on pillows or blankets to keep it elevated comfortably  Prevent another ankle sprain:   · Let your ankle heal   Find out how long your ligament needs to heal  Do not do any physical activity until your healthcare provider says it is okay  If you start activity too soon, you may develop a more serious injury  · Always warm up and stretch  before you exercise or play sports  · Use the right equipment  Always wear shoes that fit well and are made for the activity that you are doing  You may also need ankle supports, elbow and knee pads, or braces  Follow up with your doctor as directed:  Write down your questions so you remember to ask them during your visits  © [employer] 2022 Information is for End User's use only and may not be sold, redistributed or otherwise used for commercial purposes  All illustrations and images included in CareNotes® are the copyrighted property of A D A M , Inc  or [address] [place] [address] [facility]  The above information is an  only  It is not intended as medical advice for individual conditions or treatments  Talk to your doctor, nurse or pharmacist before following any medical regimen to see if it is safe and effective for you  Avulsion Fracture   WHAT YOU NEED TO KNOW:   An avulsion fracture is when a small piece of bone breaks and pulls away from a larger bone  Part or all of the piece may break away  DISCHARGE INSTRUCTIONS:   Call your local emergency number (911 in the [address] [place] [address],[address]) if:   · You feel lightheaded, short of breath, and have chest pain  · You cough up blood  Return to the emergency department if:   · Your leg feels warm, tender, and painful   It may look swollen and red  · Your cast cracks or is damaged  · The pain in your injured limb gets worse even after you rest and take medicine  · The skin, toes, or fingers of your injured limb become swollen, cold, or blue  Call your doctor if:   · You have numbness or tingling in your hand or foot below your cast     · You cannot move your fingers or toes below the cast      · You have new sores or redness around your cast or splint  · You have new or worsening trouble moving your injured limb  · You have questions or concerns about your condition or care  Medicines: You may need any of the following:  · Prescription pain medicine  may be given  Ask your healthcare provider how to take this medicine safely  Some prescription pain medicines contain acetaminophen  Do not take other medicines that contain acetaminophen without talking to your healthcare provider  Too much acetaminophen may cause liver damage  Prescription pain medicine may cause constipation  Ask your healthcare provider how to prevent or treat constipation  · NSAIDs , such as ibuprofen, help decrease swelling, pain, and fever  This medicine is available with or without a doctor's order  NSAIDs can cause stomach bleeding or kidney problems in certain people  If you take blood thinner medicine, always ask your healthcare provider if NSAIDs are safe for you  Always read the medicine label and follow directions  · Take your medicine as directed  Contact your healthcare provider if you think your medicine is not helping or if you have side effects  Tell him or her if you are allergic to any medicine  Keep a list of the medicines, vitamins, and herbs you take  Include the amounts, and when and why you take them  Bring the list or the pill bottles to follow-up visits  Carry your medicine list with you in case of an emergency  Self-care:   · Limit activity as directed  Get plenty of rest while your fracture heals   When the pain decreases, begin normal, slow movements  Slowly start to do more each day  Rest when you feel it is needed  · Apply ice  on your injury for 15 to 20 minutes every hour or as directed  Use an ice pack, or put crushed ice in a plastic bag  Cover it with a towel  Ice helps prevent tissue damage and decreases swelling and pain  · Elevate  your injured limb above the level of your heart as often as you can  This will help decrease swelling and pain  Prop your injured limb on pillows or blankets to keep it elevated comfortably  · Use support devices as directed  You may need to use crutches or a walker until your fracture heals  Ask for more information about how to use these walking devices if needed  Bathing with a cast or splint: If you have a cast or splint, it is important not to get it wet  Before bathing, cover the cast or splint with a plastic bag  Tape the bag to your skin above the cast or splint to seal out the water  Hold your arm or leg away from the water in case the bag leaks  Ask when it is okay to take a bath or shower  Cast or splint care:   · Check the skin around the cast or splint every day  · Do not push down or lean on any part of the cast or splint because it may break  · Do not use a sharp or pointed object to scratch your skin under the cast or splint  Physical therapy:  A physical therapist may teach you exercises to strengthen your injured limb once the pain is gone  Follow up with your doctor as directed: You will need to return for more tests to see how well your fracture is healing  Write down your questions so you remember to ask them during your visits  © Copyright [employer] 2022 Information is for End User's use only and may not be sold, redistributed or otherwise used for commercial purposes   All illustrations and images included in CareNotes® are the copyrighted property of [employer]  or Suni Gray  The above information is an educational aid only  It is not intended as medical advice for individual conditions or treatments  Talk to your doctor, nurse or pharmacist before following any medical regimen to see if it is safe and effective for you  Follow up with PCP in 3-5 days  Proceed to  ER if symptoms worsen  Chief Complaint     Chief Complaint   Patient presents with    Ankle Pain     Patient c/o R ankle pain with swelling and foot tingling after he rolled his ankle today         History of Present Illness       46-year-old male presents today for evaluation of right ankle pain after rolling it on uneven ground earlier today  Denies other injury  Review of Systems   Review of Systems   Constitutional: Negative for chills, fatigue and fever  HENT: Negative for postnasal drip, sore throat and trouble swallowing  Respiratory: Negative for chest tightness and shortness of breath  Gastrointestinal: Negative for abdominal pain  Genitourinary: Negative for dysuria  Musculoskeletal: Positive for arthralgias (Right ankle)  Skin: Negative for rash  Allergic/Immunologic: Negative for immunocompromised state  Neurological: Negative for dizziness, light-headedness and headaches  Psychiatric/Behavioral: Negative for confusion           Current Medications       Current Outpatient Medications:     butalbital-acetaminophen-caffeine (FIORICET,ESGIC) -40 mg per tablet, Take 1 tablet by mouth every 4 (four) hours as needed for migraine  , Disp: , Rfl:     Multiple Vitamin (MULTI-VITAMIN DAILY) TABS, Take 1 tablet by mouth daily, Disp: , Rfl:     sertraline (ZOLOFT) 100 mg tablet, Take 1 tablet (100 mg total) by mouth daily, Disp: 90 tablet, Rfl: 3    sertraline (Zoloft) 50 mg tablet, Take one tablet daily with 100 mg tablet- total dose is 150 mg/day, Disp: 30 tablet, Rfl: 5    Current Allergies     Allergies as of 06/24/2022    (No Known Allergies)            The following portions of the patient's history were reviewed and updated as appropriate: allergies, current medications, past family history, past medical history, past social history, past surgical history and problem list      Past Medical History:   Diagnosis Date    Depression     Headache     migraines    Hearing aid worn     bilat    Tinnitus     Umbilical hernia     Wears contact lenses     Wears glasses        Past Surgical History:   Procedure Laterality Date    ADENOIDECTOMY      HEMORRHOID SURGERY N/A 11/29/2017    Procedure: HEMORRHOIDECTOMY EXCISION EXTERNAL AND INTERNAL;  Surgeon: Pablo Warner MD;  Location[facility];  Service: General    HERNIA REPAIR      KNEE SURGERY Left     ID REPAIR UMBILICAL AVTG,5+V/X,TLXAL N/A 12/6/2018    Procedure: REPAIR HERNIA UMBILICAL  WITH    MESH;  Surgeon: Pablo Warner MD;  [facility];  Service: General    ID THYROID LOBECTOMY,UNILAT Right 8/31/2017    Procedure: HEMITHYROIDECTOMY;  Surgeon: Wendy Rankin MD;  Location:  MAIN OR;  Service: Surgical Oncology    [address] [place] [address]      for sleep apnea    TONSILLECTOMY      UVULOPALATOPHARYNGOPLASTY         Family History   Problem Relation Age of Onset    Stroke Mother     Depression Mother     Hypertension Mother     Depression Father     Melanoma Father     Parkinsonism Father     Bone cancer Maternal Grandmother     Prostate cancer Paternal Grandfather     Stroke Other     Depression Other          Medications have been verified  Objective   /89   Pulse 77   Temp 99 1 °F (37 3 °C)   Resp 18   SpO2 98%        Physical Exam     Physical Exam  Vitals and nursing note reviewed  Constitutional:       Appearance: Normal appearance  HENT:      Head: Normocephalic and atraumatic  Musculoskeletal:      Right ankle: Swelling present  No deformity, ecchymosis or lacerations  Tenderness present over the ATF ligament and AITF ligament   No lateral malleolus, medial malleolus, base of 5th metatarsal or proximal fibula tenderness  Decreased range of motion  Right Achilles Tendon: Normal       Left ankle: Normal       Left Achilles Tendon: Normal    Skin:     General: Skin is warm and dry  Capillary Refill: Capillary refill takes less than 2 seconds  Neurological:      General: No focal deficit present  Mental Status: He is alert and oriented to person, place, and time     Psychiatric:         Mood and Affect: Mood normal          Behavior: Behavior normal

## 2022-07-24 DIAGNOSIS — F32.A DEPRESSION, UNSPECIFIED DEPRESSION TYPE: ICD-10-CM

## 2022-07-25 RX ORDER — SERTRALINE HYDROCHLORIDE 100 MG/1
TABLET, FILM COATED ORAL
Qty: 90 TABLET | Refills: 1 | Status: SHIPPED | OUTPATIENT
Start: 2022-07-25

## 2022-08-16 DIAGNOSIS — G47.33 OSA (OBSTRUCTIVE SLEEP APNEA): ICD-10-CM

## 2022-08-16 DIAGNOSIS — Z85.850 HISTORY OF THYROID CANCER: ICD-10-CM

## 2022-08-16 DIAGNOSIS — E78.2 MIXED HYPERLIPIDEMIA: ICD-10-CM

## 2022-08-16 DIAGNOSIS — R73.09 ELEVATED GLUCOSE: Primary | ICD-10-CM

## 2022-08-18 ENCOUNTER — APPOINTMENT (OUTPATIENT)
Dept: LAB | Facility: MEDICAL CENTER | Age: 47
End: 2022-08-18
Payer: COMMERCIAL

## 2022-08-18 LAB
ALBUMIN SERPL BCP-MCNC: 4.2 G/DL (ref 3.5–5)
ALP SERPL-CCNC: 53 U/L (ref 46–116)
ALT SERPL W P-5'-P-CCNC: 77 U/L (ref 12–78)
ANION GAP SERPL CALCULATED.3IONS-SCNC: 3 MMOL/L (ref 4–13)
AST SERPL W P-5'-P-CCNC: 32 U/L (ref 5–45)
BACTERIA UR QL AUTO: ABNORMAL /HPF
BASOPHILS # BLD AUTO: 0.07 THOUSANDS/ΜL (ref 0–0.1)
BASOPHILS NFR BLD AUTO: 1 % (ref 0–1)
BILIRUB SERPL-MCNC: 0.88 MG/DL (ref 0.2–1)
BILIRUB UR QL STRIP: NEGATIVE
BUN SERPL-MCNC: 14 MG/DL (ref 5–25)
CALCIUM SERPL-MCNC: 9.2 MG/DL (ref 8.3–10.1)
CHLORIDE SERPL-SCNC: 105 MMOL/L (ref 96–108)
CHOLEST SERPL-MCNC: 213 MG/DL
CLARITY UR: CLEAR
CO2 SERPL-SCNC: 29 MMOL/L (ref 21–32)
COLOR UR: ABNORMAL
CREAT SERPL-MCNC: 1.09 MG/DL (ref 0.6–1.3)
EOSINOPHIL # BLD AUTO: 0.3 THOUSAND/ΜL (ref 0–0.61)
EOSINOPHIL NFR BLD AUTO: 4 % (ref 0–6)
ERYTHROCYTE [DISTWIDTH] IN BLOOD BY AUTOMATED COUNT: 12.6 % (ref 11.6–15.1)
EST. AVERAGE GLUCOSE BLD GHB EST-MCNC: 108 MG/DL
GFR SERPL CREATININE-BSD FRML MDRD: 82 ML/MIN/1.73SQ M
GLUCOSE P FAST SERPL-MCNC: 105 MG/DL (ref 65–99)
GLUCOSE UR STRIP-MCNC: NEGATIVE MG/DL
HBA1C MFR BLD: 5.4 %
HCT VFR BLD AUTO: 46.7 % (ref 36.5–49.3)
HDLC SERPL-MCNC: 46 MG/DL
HGB BLD-MCNC: 15.5 G/DL (ref 12–17)
HGB UR QL STRIP.AUTO: NEGATIVE
IMM GRANULOCYTES # BLD AUTO: 0.02 THOUSAND/UL (ref 0–0.2)
IMM GRANULOCYTES NFR BLD AUTO: 0 % (ref 0–2)
KETONES UR STRIP-MCNC: NEGATIVE MG/DL
LDLC SERPL CALC-MCNC: 119 MG/DL (ref 0–100)
LEUKOCYTE ESTERASE UR QL STRIP: NEGATIVE
LYMPHOCYTES # BLD AUTO: 1.84 THOUSANDS/ΜL (ref 0.6–4.47)
LYMPHOCYTES NFR BLD AUTO: 25 % (ref 14–44)
MCH RBC QN AUTO: 29.4 PG (ref 26.8–34.3)
MCHC RBC AUTO-ENTMCNC: 33.2 G/DL (ref 31.4–37.4)
MCV RBC AUTO: 88 FL (ref 82–98)
MONOCYTES # BLD AUTO: 0.49 THOUSAND/ΜL (ref 0.17–1.22)
MONOCYTES NFR BLD AUTO: 7 % (ref 4–12)
MUCOUS THREADS UR QL AUTO: ABNORMAL
NEUTROPHILS # BLD AUTO: 4.52 THOUSANDS/ΜL (ref 1.85–7.62)
NEUTS SEG NFR BLD AUTO: 63 % (ref 43–75)
NITRITE UR QL STRIP: NEGATIVE
NON-SQ EPI CELLS URNS QL MICRO: ABNORMAL /HPF
NONHDLC SERPL-MCNC: 167 MG/DL
NRBC BLD AUTO-RTO: 0 /100 WBCS
PH UR STRIP.AUTO: 7 [PH]
PLATELET # BLD AUTO: 287 THOUSANDS/UL (ref 149–390)
PMV BLD AUTO: 9 FL (ref 8.9–12.7)
POTASSIUM SERPL-SCNC: 4.3 MMOL/L (ref 3.5–5.3)
PROT SERPL-MCNC: 7.4 G/DL (ref 6.4–8.4)
PROT UR STRIP-MCNC: ABNORMAL MG/DL
RBC # BLD AUTO: 5.28 MILLION/UL (ref 3.88–5.62)
RBC #/AREA URNS AUTO: ABNORMAL /HPF
SODIUM SERPL-SCNC: 137 MMOL/L (ref 135–147)
SP GR UR STRIP.AUTO: 1.02 (ref 1–1.03)
T4 FREE SERPL-MCNC: 0.94 NG/DL (ref 0.76–1.46)
TRIGL SERPL-MCNC: 238 MG/DL
TSH SERPL DL<=0.05 MIU/L-ACNC: 2.64 UIU/ML (ref 0.45–4.5)
UROBILINOGEN UR STRIP-ACNC: <2 MG/DL
WBC # BLD AUTO: 7.24 THOUSAND/UL (ref 4.31–10.16)
WBC #/AREA URNS AUTO: ABNORMAL /HPF

## 2022-08-18 PROCEDURE — 80053 COMPREHEN METABOLIC PANEL: CPT | Performed by: INTERNAL MEDICINE

## 2022-08-18 PROCEDURE — 83036 HEMOGLOBIN GLYCOSYLATED A1C: CPT | Performed by: INTERNAL MEDICINE

## 2022-08-18 PROCEDURE — 36415 COLL VENOUS BLD VENIPUNCTURE: CPT | Performed by: INTERNAL MEDICINE

## 2022-08-18 PROCEDURE — 85025 COMPLETE CBC W/AUTO DIFF WBC: CPT | Performed by: INTERNAL MEDICINE

## 2022-08-18 PROCEDURE — 84439 ASSAY OF FREE THYROXINE: CPT | Performed by: INTERNAL MEDICINE

## 2022-08-18 PROCEDURE — 84443 ASSAY THYROID STIM HORMONE: CPT | Performed by: INTERNAL MEDICINE

## 2022-08-18 PROCEDURE — 81001 URINALYSIS AUTO W/SCOPE: CPT | Performed by: INTERNAL MEDICINE

## 2022-08-18 PROCEDURE — 80061 LIPID PANEL: CPT | Performed by: INTERNAL MEDICINE

## 2022-08-19 ENCOUNTER — OFFICE VISIT (OUTPATIENT)
Dept: INTERNAL MEDICINE CLINIC | Facility: CLINIC | Age: 47
End: 2022-08-19
Payer: COMMERCIAL

## 2022-08-19 ENCOUNTER — TELEPHONE (OUTPATIENT)
Dept: PSYCHIATRY | Facility: CLINIC | Age: 47
End: 2022-08-19

## 2022-08-19 VITALS
BODY MASS INDEX: 33.29 KG/M2 | SYSTOLIC BLOOD PRESSURE: 138 MMHG | WEIGHT: 245.8 LBS | HEART RATE: 85 BPM | DIASTOLIC BLOOD PRESSURE: 76 MMHG | HEIGHT: 72 IN | OXYGEN SATURATION: 98 % | RESPIRATION RATE: 16 BRPM | TEMPERATURE: 97 F

## 2022-08-19 DIAGNOSIS — S60.512A ABRASION OF LEFT HAND, INITIAL ENCOUNTER: ICD-10-CM

## 2022-08-19 DIAGNOSIS — M17.12 ARTHRITIS OF LEFT KNEE: ICD-10-CM

## 2022-08-19 DIAGNOSIS — M47.812 CERVICAL SPONDYLOSIS WITHOUT MYELOPATHY: ICD-10-CM

## 2022-08-19 DIAGNOSIS — H91.93 BILATERAL HEARING LOSS, UNSPECIFIED HEARING LOSS TYPE: ICD-10-CM

## 2022-08-19 DIAGNOSIS — G43.109 MIGRAINE WITH AURA AND WITHOUT STATUS MIGRAINOSUS, NOT INTRACTABLE: ICD-10-CM

## 2022-08-19 DIAGNOSIS — G47.33 OSA (OBSTRUCTIVE SLEEP APNEA): ICD-10-CM

## 2022-08-19 DIAGNOSIS — Z00.00 ANNUAL PHYSICAL EXAM: ICD-10-CM

## 2022-08-19 DIAGNOSIS — G89.29 CHRONIC MIDLINE LOW BACK PAIN WITHOUT SCIATICA: ICD-10-CM

## 2022-08-19 DIAGNOSIS — F32.A DEPRESSION, UNSPECIFIED DEPRESSION TYPE: ICD-10-CM

## 2022-08-19 DIAGNOSIS — E78.2 MIXED HYPERLIPIDEMIA: ICD-10-CM

## 2022-08-19 DIAGNOSIS — M54.50 CHRONIC MIDLINE LOW BACK PAIN WITHOUT SCIATICA: ICD-10-CM

## 2022-08-19 DIAGNOSIS — R73.09 ELEVATED GLUCOSE: ICD-10-CM

## 2022-08-19 DIAGNOSIS — D18.02 CAVERNOUS HEMANGIOMA OF BRAIN (HCC): Primary | ICD-10-CM

## 2022-08-19 PROCEDURE — 99396 PREV VISIT EST AGE 40-64: CPT | Performed by: INTERNAL MEDICINE

## 2022-08-19 PROCEDURE — 90715 TDAP VACCINE 7 YRS/> IM: CPT

## 2022-08-19 PROCEDURE — 90471 IMMUNIZATION ADMIN: CPT

## 2022-08-19 NOTE — ASSESSMENT & PLAN NOTE
Most recent CTA in 2017 and MRI in 2018 - significant for cavernous hemangioma  Has not had follow up with specialist since imaging obtained  Reports occasional difficulty recalling detailed such as names from people he has known for years       · Referral to neurosurgery

## 2022-08-19 NOTE — ASSESSMENT & PLAN NOTE
Reports 15 years of behavioral therapy  Has been responsive to therapy   Current therapist is retiring and is interested in establishing care with new therapist      · Referral to Behavioral health therapist

## 2022-08-19 NOTE — PATIENT INSTRUCTIONS

## 2022-08-19 NOTE — ASSESSMENT & PLAN NOTE
Reports stable symptoms, with decreased snoring  Uses CPAP as needed   Has history of adenoids removal      · Continue CPAP use as needed

## 2022-08-19 NOTE — PROGRESS NOTES
ADULT ANNUAL [address] INTERNAL MEDICINE    NAME: Margaux Sandhu  AGE: 42 y o  SEX: male  : 1975     DATE: 2022     Assessment and Plan:     Problem List Items Addressed This Visit        Respiratory    KRISTIE (obstructive sleep apnea)     Reports stable symptoms, with decreased snoring  Uses CPAP as needed  Has history of adenoids removal      · Continue CPAP use as needed            Cardiovascular and Mediastinum    Cavernous hemangioma of brain ([facility] [address] ) - Primary     Most recent CTA in 2017 and MRI in 2018 - significant for cavernous hemangioma  Has not had follow up with specialist since imaging obtained  Reports occasional difficulty recalling detailed such as names from people he has known for years  · Referral to neurosurgery          Relevant Orders    Ambulatory Referral to Neurosurgery    Migraine with aura and without status migrainosus, not intractable     Denied recent episodes of migraine  · Continue Fioricet PRN             Nervous and Auditory    Bilateral hearing loss     Patient denies worsening of hearing  Musculoskeletal and Integument    Arthritis of left knee     Patient denied currently experiencing pain  · Encouraged physical activity, exercise          Cervical spondylosis without myelopathy       Other    Chronic low back pain     Patient reports previous back surgery  Has not experienced back pain recently  Depression     Reports 15 years of behavioral therapy  Has been responsive to therapy   Current therapist is retiring and is interested in establishing care with new therapist      · Referral to Behavioral health therapist          Relevant Orders    Ambulatory Referral to [facility]    Elevated glucose    Mixed hyperlipidemia    Relevant Orders    Lipid panel      Other Visit Diagnoses     Annual physical exam        Abrasion of left hand, initial encounter Relevant Orders    TDAP VACCINE GREATER THAN OR EQUAL TO 6YO IM (Completed)          Immunizations and preventive care screenings were discussed with patient today  Appropriate education was printed on patient's after visit summary  Counseling:  Alcohol/drug use: discussed moderation in alcohol intake, the recommendations for healthy alcohol use, and avoidance of illicit drug use  · Exercise: the importance of regular exercise/physical activity was discussed  Recommend exercise 3-5 times per week for at least 30 minutes  BMI Counseling: Body mass index is 33 34 kg/m²  The BMI is above normal  Nutrition recommendations include decreasing portion sizes, consuming healthier snacks, moderation in carbohydrate intake, reducing intake of saturated and trans fat and reducing intake of cholesterol  Exercise recommendations include moderate physical activity 150 minutes/week and exercising 3-5 times per week  Rationale for BMI follow-up plan is due to patient being overweight or obese  Nutrition Assessment and Intervention:     Reviewed food recall journal    New Nutrition Prescription completed with patient      Physical Activity Assessment and Intervention:    Activity journal reviewed    Physical Activity Prescription completed with patient      Emotional and Mental Well-being, Sleep, Connectedness Assessment and Intervention:    Sleep/stress assessment performed    Depression and anxiety screening performed and reviewed    [address] referral given      Tobacco and Toxic Substance Assessment and Intervention:     Tobacco use screening performed    Alcohol and drug use screening performed    Brief intervention performed for tobacco, alcohol, or drug use    Alcohol cessation counseling provided      Therapeutic Lifestyle Change Visit:     One-on-one comprehensive counseling, coaching, and health behavior change visit completed          Return in 1 year (on 8/19/2023)       Chief Complaint:     No chief complaint on file  History of Present Illness:     Adult Annual Physical   Patient here for a comprehensive physical exam  The patient reports no problems  Margaux Sandhu is  42 y o  male with history of Depression, Cavernous hemangioma, KRISTIE, Thyroid Ca s/p partial thyroidectomy, and previous prediabetes, that arrives to clinic for AWV  Patient's has been attempting some diet changes including switching lunch meals to salads most of the time  Patient does recognized he needs to change his high caloric intake and increase intake of healther food options  Patient reports his exercise habits could be better and has not been physically active in the last 2 months  Patient notes that he lost his father around a year ago and that affected his lifestyle habits  Patient does feel motivated to make changes  Patient's alcoholc intake is around 8 beer over the weekend  Patient counseled on decreasing intake limiting to 2 or less drinks per day  Patient recognizes that even though his brother owns a brewery that he can make some changes to his habits  Patient denied tobacco use, and use of recreational drugs  Recent lab work was discussed with patient  Diet and Physical Activity  · Diet/Nutrition: poor diet, low fat diet and limited fruits/vegetables  · Exercise: no formal exercise  Depression Screening  PHQ-2/9 Depression Screening         General Health  · Sleep: sleeps well, gets 7-8 hours of sleep on average and unrefreshing sleep  · Hearing: normal - bilateral   · Vision: vision problems: reports he may need glasses for far-sight and most recent eye exam <1 year ago  · Dental: regular dental visits and brushes teeth twice daily   Health  · Symptoms include: none  Sexually active with wife  Review of Systems:     Review of Systems   Constitutional: Negative for chills, diaphoresis, fatigue and fever     HENT: Negative for ear pain, hearing loss, postnasal drip and sore throat  Eyes: Negative for pain and visual disturbance  Respiratory: Negative for apnea, cough, chest tightness and shortness of breath  Cardiovascular: Negative for chest pain, palpitations and leg swelling  Gastrointestinal: Negative for abdominal pain, diarrhea, nausea and vomiting  Genitourinary: Negative for difficulty urinating, dysuria, frequency and hematuria  Musculoskeletal: Negative for arthralgias and back pain  Skin: Negative for color change and rash  Neurological: Negative for seizures, syncope, light-headedness and headaches  Psychiatric/Behavioral: Negative for confusion, decreased concentration and dysphoric mood  The patient is not nervous/anxious  All other systems reviewed and are negative       Past Medical History:     Past Medical History:   Diagnosis Date    Depression     Headache     migraines    Hearing aid worn     bilat    Tinnitus     Umbilical hernia     Wears contact lenses     Wears glasses       Past Surgical History:     Past Surgical History:   Procedure Laterality Date    ADENOIDECTOMY      HEMORRHOID SURGERY N/A 11/29/2017    Procedure: HEMORRHOIDECTOMY EXCISION EXTERNAL AND INTERNAL;  Surgeon: Charmayne Honey, MD;  [facility];  Service: General    HERNIA REPAIR      KNEE SURGERY Left     DE REPAIR UMBILICAL TRKX,1+Y/A,DQBIU N/A 12/6/2018    Procedure: REPAIR HERNIA UMBILICAL  WITH    MESH;  Surgeon: Charmayne Honey, MD;  [facility];  Service: General    DE THYROID LOBECTOMY,UNILAT Right 8/31/2017    Procedure: HEMITHYROIDECTOMY;  Surgeon: Tad Bazan MD;  [facility];  Service: Surgical Oncology    REFRACTIVE SURGERY      THROAT SURGERY      for sleep apnea    TONSILLECTOMY      UVULOPALATOPHARYNGOPLASTY        Family History:     Family History   Problem Relation Age of Onset   Mary Pritchard Stroke Mother     Depression Mother     Hypertension Mother     Depression Father     Melanoma Father    Mary Pritchard Parkinsonism Father     Bone cancer Maternal Grandmother     Prostate cancer Paternal Grandfather     Stroke Other     Depression Other       Social History:     Social History     Socioeconomic History    Marital status: /Civil Union     Spouse name: None    Number of children: 2    Years of education: achelors degree    Highest education level: None   Occupational History    Occupation: currently working     Comment: fulltime   Tobacco Use    Smoking status: Former Smoker     Packs/day: 0 25     Years: 10 00     Pack years: 2 50     Types: Cigarettes     Quit date: 2014     Years since quittin 9    Smokeless tobacco: Never Used   Vaping Use    Vaping Use: Never used   Substance and Sexual Activity    Alcohol use: Yes     Alcohol/week: 2 0 standard drinks     Types: 2 Cans of beer per week     Comment: beer - 'few time' a week    Drug use: No    Sexual activity: None   Other Topics Concern    None   Social History Narrative    None     Social Determinants of Health     Financial Resource Strain: Not on file   Food Insecurity: Not on file   Transportation Needs: Not on file   Physical Activity: Not on file   Stress: Not on file   Social Connections: Not on file   Intimate Partner Violence: Not on file   Housing Stability: Not on file      Current Medications:     Current Outpatient Medications   Medication Sig Dispense Refill    butalbital-acetaminophen-caffeine (FIORICET,ESGIC) -40 mg per tablet Take 1 tablet by mouth every 4 (four) hours as needed for migraine        Multiple Vitamin (MULTI-VITAMIN DAILY) TABS Take 1 tablet by mouth daily      sertraline (ZOLOFT) 100 mg tablet TAKE 1 TABLET BY MOUTH EVERY DAY 90 tablet 1    sertraline (ZOLOFT) 50 mg tablet TAKE ONE TABLET DAILY WITH 100 MG TABLET- TOTAL DOSE  MG/DAY 90 tablet 0     No current facility-administered medications for this visit        Allergies:     No Known Allergies   Physical Exam:     /76 Pulse 85   Temp (!) 97 °F (36 1 °C)   Resp 16   Ht 6' (1 829 m)   Wt 111 kg (245 lb 12 8 oz)   SpO2 98%   BMI 33 34 kg/m²     Physical Exam  Vitals reviewed  Constitutional:       General: He is not in acute distress  Appearance: Normal appearance  He is obese  He is not ill-appearing  HENT:      Head: Normocephalic and atraumatic  Mouth/Throat:      Mouth: Mucous membranes are moist       Pharynx: Oropharynx is clear  Eyes:      General: No scleral icterus  Conjunctiva/sclera: Conjunctivae normal       Pupils: Pupils are equal, round, and reactive to light  Cardiovascular:      Rate and Rhythm: Normal rate and regular rhythm  Pulses: Normal pulses  Heart sounds: No murmur heard  No gallop  Pulmonary:      Effort: Pulmonary effort is normal  No respiratory distress  Breath sounds: Normal breath sounds  No wheezing or rales  Abdominal:      General: Bowel sounds are normal  There is no distension  Palpations: Abdomen is soft  There is no mass  Tenderness: There is no abdominal tenderness  Musculoskeletal:         General: No tenderness  Normal range of motion  Cervical back: Normal range of motion and neck supple  No tenderness  Right lower leg: No edema  Left lower leg: No edema  Skin:     General: Skin is warm and dry  Capillary Refill: Capillary refill takes less than 2 seconds  Coloration: Skin is not jaundiced or pale  Neurological:      General: No focal deficit present  Mental Status: He is alert and oriented to person, place, and time  Mental status is at baseline  Sensory: No sensory deficit     Psychiatric:         Mood and Affect: Mood normal          Behavior: Behavior normal           Dirk Gordon MD  [facility]

## 2022-08-30 ENCOUNTER — TELEPHONE (OUTPATIENT)
Dept: PSYCHIATRY | Facility: CLINIC | Age: 47
End: 2022-08-30

## 2022-09-15 NOTE — TELEPHONE ENCOUNTER
Called Patient in regards to referral and placing on proper wait list  LVM for patien to contact intake department

## 2022-09-20 ENCOUNTER — OFFICE VISIT (OUTPATIENT)
Dept: NEUROSURGERY | Facility: CLINIC | Age: 47
End: 2022-09-20
Payer: COMMERCIAL

## 2022-09-20 VITALS
DIASTOLIC BLOOD PRESSURE: 90 MMHG | SYSTOLIC BLOOD PRESSURE: 130 MMHG | TEMPERATURE: 98.2 F | BODY MASS INDEX: 33.46 KG/M2 | HEIGHT: 72 IN | WEIGHT: 247 LBS

## 2022-09-20 DIAGNOSIS — D18.02 CAVERNOUS HEMANGIOMA OF BRAIN (HCC): ICD-10-CM

## 2022-09-20 DIAGNOSIS — R41.3 MEMORY DIFFICULTY: Primary | ICD-10-CM

## 2022-09-20 PROCEDURE — 99203 OFFICE O/P NEW LOW 30 MIN: CPT | Performed by: PHYSICIAN ASSISTANT

## 2022-09-20 NOTE — ASSESSMENT & PLAN NOTE
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, last imaging 2018  · No recent h/o seizures or WHOL, no other neurological complaints  · Exam GCS 15, nonfocal    Imaging:  · No imaging to review today    Plan:  · Given lack of sudden neurological symptoms, I have a low suspicion that this vascular anomaly has recently bled   No further imaging is indicated at this time  · Given 2 years of worsening memory difficulty as well as chronic migraines which are also progressively worsening, will refer to neurology for further work up  · Proceed to ED for [facility] or new onset seizure activity  · Follow up in our office on an as needed basis

## 2022-09-20 NOTE — PROGRESS NOTES
Neurosurgery Office Note  Israel Toussaint 42 y o  male MRN: [id number]      Assessment/Plan     Cavernous hemangioma of brain [facility])  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, last imaging 2018  · No recent h/o seizures or WHOL, no other neurological complaints  · Exam GCS 15, nonfocal    Imaging:  · No imaging to review today    Plan:  · Given lack of sudden neurological symptoms, I have a low suspicion that this vascular anomaly has recently bled  No further imaging is indicated at this time  · Given 2 years of worsening memory difficulty as well as chronic migraines which are also progressively worsening, will refer to neurology for further work up  · Proceed to ED for [facility] or new onset seizure activity  · Follow up in our office on an as needed basis         Diagnoses and all orders for this visit:    Memory difficulty  -     Ambulatory Referral to Neurology; Future    Cavernous hemangioma of brain [facility])  -     Ambulatory Referral to Neurosurgery  -     Ambulatory Referral to Neurology; Future          I spent 15 minutes with the patient today in which >50% of the time was spent counseling/coordination of care regarding diagnosis, imaging review, symptoms and treatment plan  CHIEF COMPLAINT    Chief Complaint   Patient presents with    Consult     Cavernous hemangioma of brain       HISTORY    This is a 79-year-old male with a past medical history significant for depression, migraines, bilateral hearing loss and tinnitus since serving in the [employer] who is here today for evaluation of worsening memory and migraines  He does have a history of a 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 which was discovered incidentally in 2017 during workup for tinnitus  One year repeat MRI was stable  He was released on as-needed basis at that time      The patient states that over the past 2 years, and more specifically over the past 60 months he has developed worsening difficulty with memory and migraines  His memory difficulty is very specific to names and word-finding difficulty  He does have a history of chronic migraines that nearly always start in his left eye and radiate back  This has become more intense in nature and slightly more frequent  He states that his headaches are sometimes relieved with Fioricet or Motrin and sitting in a dark room  He denies any aura or visual deficits associated with his migraines  He states that sometimes they last for several days  He denies any seizures, syncope, speech or vision complaints, focal weakness, loss of balance or gait difficulty, bowel or bladder incontinence  See Discussion    REVIEW OF SYSTEMS    Review of Systems   Constitutional: Negative  HENT: Positive for tinnitus  Eyes: Negative  Respiratory: Negative  Cardiovascular: Negative  Gastrointestinal: Negative  Endocrine: Negative  Genitourinary: Negative  Musculoskeletal: Negative  Skin: Negative  Allergic/Immunologic: Negative  Neurological: Positive for headaches  Hematological: Negative  Psychiatric/Behavioral: Positive for confusion (Hard time remembering peoples names or certain words  )  ROS was personally reviewed and changes made as needed     Meds/Allergies     Current Outpatient Medications   Medication Sig Dispense Refill    butalbital-acetaminophen-caffeine (FIORICET,ESGIC) -40 mg per tablet Take 1 tablet by mouth every 4 (four) hours as needed for migraine        Multiple Vitamin (MULTI-VITAMIN DAILY) TABS Take 1 tablet by mouth daily      sertraline (ZOLOFT) 100 mg tablet TAKE 1 TABLET BY MOUTH EVERY DAY 90 tablet 1    sertraline (ZOLOFT) 50 mg tablet TAKE ONE TABLET DAILY WITH 100 MG TABLET- TOTAL DOSE  MG/DAY 90 tablet 0     No current facility-administered medications for this visit         No Known Allergies    PAST HISTORY    Past Medical History:   Diagnosis Date    Depression     Headache     migraines    Hearing aid worn     bilat    Tinnitus     Umbilical hernia     Wears contact lenses     Wears glasses        Past Surgical History:   Procedure Laterality Date    ADENOIDECTOMY      HEMORRHOID SURGERY N/A 2017    Procedure: HEMORRHOIDECTOMY EXCISION EXTERNAL AND INTERNAL;  Surgeon: Christopher Ndiaye MD;  [facility];  Service: General    HERNIA REPAIR      KNEE SURGERY Left     NC REPAIR UMBILICAL OBFV,2+M/C,LVBGV N/A 2018    Procedure: REPAIR HERNIA UMBILICAL  WITH    MESH;  Surgeon: Christopher Ndiaye MD;  [facility];  Service: General    NC THYROID LOBECTOMY,UNILAT Right 2017    Procedure: HEMITHYROIDECTOMY;  Surgeon: Yolande Avery MD;  [facility];  Service: Surgical Oncology    REFRACTIVE SURGERY      THROAT SURGERY      for sleep apnea    TONSILLECTOMY      UVULOPALATOPHARYNGOPLASTY         Social History     Tobacco Use    Smoking status: Former Smoker     Packs/day: 0 25     Years: 10 00     Pack years: 2 50     Types: Cigarettes     Quit date: 2014     Years since quittin 0    Smokeless tobacco: Never Used   Vaping Use    Vaping Use: Never used   Substance Use Topics    Alcohol use: Yes     Alcohol/week: 2 0 standard drinks     Types: 2 Cans of beer per week     Comment: beer - 'few time' a week    Drug use: No       Family History   Problem Relation Age of Onset   Ardeth Needs Stroke Mother     Depression Mother     Hypertension Mother     Depression Father     Melanoma Father     Parkinsonism Father     Bone cancer Maternal Grandmother     Prostate cancer Paternal Grandfather     Stroke Other     Depression Other          Above history personally reviewed  EXAM    Vitals:Blood pressure 130/90, temperature 98 2 °F (36 8 °C), temperature source Temporal, height 6' (1 829 m), weight 112 kg (247 lb)  ,Body mass index is 33 5 kg/m²       Physical Exam  Vitals and nursing note reviewed  Constitutional:       Appearance: Normal appearance  He is well-developed and normal weight  HENT:      Head: Normocephalic and atraumatic  Eyes:      Extraocular Movements: Extraocular movements intact  Pupils: Pupils are equal, round, and reactive to light  Cardiovascular:      Rate and Rhythm: Normal rate  Pulmonary:      Effort: Pulmonary effort is normal  No respiratory distress  Abdominal:      Palpations: Abdomen is soft  Musculoskeletal:         General: Normal range of motion  Cervical back: Normal range of motion  Skin:     General: Skin is warm and dry  Neurological:      General: No focal deficit present  Mental Status: He is alert and oriented to person, place, and time  Coordination: Finger-Nose-Finger Test normal       Gait: Gait is intact  Deep Tendon Reflexes: Strength normal       Reflex Scores:       Bicep reflexes are 3+ on the right side and 2+ on the left side  Brachioradialis reflexes are 3+ on the right side and 2+ on the left side  Patellar reflexes are 2+ on the right side and 2+ on the left side  Psychiatric:         Mood and Affect: Mood normal          Speech: Speech normal          Behavior: Behavior normal          Thought Content: Thought content normal          Judgment: Judgment normal          Neurologic Exam     Mental Status   Oriented to person, place, and time  Follows 2 step commands  Attention: normal  Concentration: normal    Speech: speech is normal   Level of consciousness: alert  Knowledge: good  Able to perform simple calculations  Able to name object  Able to repeat  Normal comprehension  Cranial Nerves   Cranial nerves II through XII intact  CN III, IV, VI   Pupils are equal, round, and reactive to light  Motor Exam   Muscle bulk: normal  Overall muscle tone: normal  Right arm pronator drift: absent  Left arm pronator drift: absent    Strength   Strength 5/5 throughout  Sensory Exam   Light touch normal      Gait, Coordination, and Reflexes     Gait  Gait: normal    Coordination   Finger to nose coordination: normal    Tremor   Resting tremor: absent    Reflexes   Right brachioradialis: 3+  Left brachioradialis: 2+  Right biceps: 3+  Left biceps: 2+  Right patellar: 2+  Left patellar: 2+  Right Valencia: absent  Left Valencia: absent  Right ankle clonus: absent  Left ankle clonus: absent        MEDICAL DECISION MAKING    Imaging Studies:     No results found  I have personally reviewed pertinent reports

## 2022-10-28 ENCOUNTER — TELEPHONE (OUTPATIENT)
Dept: NEUROLOGY | Facility: CLINIC | Age: 47
End: 2022-10-28

## 2023-02-01 DIAGNOSIS — F32.A DEPRESSION, UNSPECIFIED DEPRESSION TYPE: ICD-10-CM

## 2023-02-01 RX ORDER — SERTRALINE HYDROCHLORIDE 100 MG/1
TABLET, FILM COATED ORAL
Qty: 90 TABLET | Refills: 1 | Status: SHIPPED | OUTPATIENT
Start: 2023-02-01

## 2023-08-11 DIAGNOSIS — F32.A DEPRESSION, UNSPECIFIED DEPRESSION TYPE: ICD-10-CM

## 2023-08-11 RX ORDER — SERTRALINE HYDROCHLORIDE 100 MG/1
TABLET, FILM COATED ORAL
Qty: 90 TABLET | Refills: 1 | Status: SHIPPED | OUTPATIENT
Start: 2023-08-11

## 2023-08-22 ENCOUNTER — OFFICE VISIT (OUTPATIENT)
Dept: INTERNAL MEDICINE CLINIC | Facility: CLINIC | Age: 48
End: 2023-08-22
Payer: COMMERCIAL

## 2023-08-22 VITALS
SYSTOLIC BLOOD PRESSURE: 132 MMHG | OXYGEN SATURATION: 97 % | HEIGHT: 72 IN | WEIGHT: 256 LBS | TEMPERATURE: 98.2 F | BODY MASS INDEX: 34.67 KG/M2 | HEART RATE: 70 BPM | DIASTOLIC BLOOD PRESSURE: 80 MMHG

## 2023-08-22 DIAGNOSIS — H91.93 BILATERAL HEARING LOSS, UNSPECIFIED HEARING LOSS TYPE: ICD-10-CM

## 2023-08-22 DIAGNOSIS — G43.109 MIGRAINE WITH AURA AND WITHOUT STATUS MIGRAINOSUS, NOT INTRACTABLE: ICD-10-CM

## 2023-08-22 DIAGNOSIS — R73.09 ELEVATED GLUCOSE: ICD-10-CM

## 2023-08-22 DIAGNOSIS — D18.02 CAVERNOUS HEMANGIOMA OF BRAIN (HCC): ICD-10-CM

## 2023-08-22 DIAGNOSIS — Z00.00 ANNUAL PHYSICAL EXAM: ICD-10-CM

## 2023-08-22 DIAGNOSIS — Z13.0 SCREENING FOR DEFICIENCY ANEMIA: ICD-10-CM

## 2023-08-22 DIAGNOSIS — E78.2 MIXED HYPERLIPIDEMIA: Primary | ICD-10-CM

## 2023-08-22 DIAGNOSIS — Z12.11 SCREENING FOR COLON CANCER: ICD-10-CM

## 2023-08-22 PROCEDURE — 99396 PREV VISIT EST AGE 40-64: CPT | Performed by: INTERNAL MEDICINE

## 2023-08-22 RX ORDER — BUTALBITAL, ACETAMINOPHEN AND CAFFEINE 50; 325; 40 MG/1; MG/1; MG/1
1 TABLET ORAL EVERY 4 HOURS PRN
Qty: 30 TABLET | Refills: 0 | Status: SHIPPED | OUTPATIENT
Start: 2023-08-22

## 2023-08-22 NOTE — ASSESSMENT & PLAN NOTE
Lab Results   Component Value Date    GLUC 109 04/16/2020    GLUC 91 01/17/2020    GLUC 96 08/04/2017      Last hemoglobin A1c 5.4% (8/2022)

## 2023-08-22 NOTE — ASSESSMENT & PLAN NOTE
Patient reports that he has bilateral hearing loss associated with tinnitus. Symptoms are stable. Denied ENT referral at the present time. Monitor symptoms.

## 2023-08-22 NOTE — PROGRESS NOTES
ADULT ANNUAL [facility] INTERNAL MEDICINE    NAME: Nick Menjivar  AGE: 41 y.o. SEX: male  : 1975     DATE: 2023     Assessment and Plan:     Problem List Items Addressed This Visit        Nervous and Auditory    Bilateral hearing loss     Patient reports that he has bilateral hearing loss associated with tinnitus. Symptoms are stable. Denied ENT referral at the present time. Monitor symptoms. Other    Elevated glucose     Lab Results   Component Value Date    GLUC 109 2020    GLUC 91 2020    GLUC 96 2017      Last hemoglobin A1c 5.4% (2022)         Relevant Orders    Comprehensive metabolic panel    Urinalysis with microscopic    Hemoglobin A1C    Mixed hyperlipidemia - Primary    Relevant Orders    Lipid panel    Screening for colon cancer    Relevant Orders    Ambulatory Referral to Gastroenterology    Screening for deficiency anemia    Relevant Orders    CBC and differential    Annual physical exam     Summary of Recommendations  · Rechecking labs: CBC, CMP, lipid panel, hemoglobin A1c, urinalysis  · Ambulatory referral to GI for colonoscopy screening  · Recommended to see his dermatologist for melanoma screening  · Recommended lifestyle modification with regular exercise and eating healthy diet  · Follow-up 1 year    Immunizations and preventive care screenings were discussed with patient today. Appropriate education was printed on patient's after visit summary. Counseling:  Alcohol/drug use: discussed moderation in alcohol intake, the recommendations for healthy alcohol use, and avoidance of illicit drug use. Dental Health: discussed importance of regular tooth brushing, flossing, and dental visits. · Exercise: the importance of regular exercise/physical activity was discussed. Recommend exercise 3-5 times per week for at least 30 minutes. No follow-ups on file.      Chief Complaint: No chief complaint on file. History of Present Illness:     Mr. Meli Sandhu is a 72-year-old male with a past medical history of migraine headache, benign cavernous hemangioma KRISTIE, depression, thyroid cancer s/p partial thyroidectomy presents to the office today for annual physical.    He reports that he is doing stable since last office visit. His migraine headaches are less frequent, once in 3 to 4 months. He has seen the neurosurgeon in September 2022 for cavernous hemangioma which is a benign lesion. Patient denied any headache, dizziness, any neurological symptoms today. Denied fever, chills, nausea, vomiting, chest pain, shortness of breath. He is interested to check screening colonoscopy, recheck lab works and to pursue regular exercise and diet for weight loss. For bilateral hearing loss with tinnitus, he would like to defer ENT referral at present time as his symptoms are stable. He said [facility] He has no new skin lesions or any concerns for melanoma at present. He reports that he will return to his previous dermatologist in this year. Adult Annual Physical   Patient here for a comprehensive physical exam. The patient reports no problems. Diet and Physical Activity  · Diet/Nutrition: well balanced diet, consuming 3-5 servings of fruits/vegetables daily, adequate fiber intake and adequate whole grain intake. · Exercise: no formal exercise. Depression Screening  PHQ-2/9 Depression Screening         General Health  · Sleep: sleeps well. · Hearing: decreased - bilateral.  · Vision: no vision problems. · Dental: regular dental visits.  Health  · Symptoms include: none     Review of Systems:     Review of Systems   Constitutional: Negative for appetite change, chills, fever and unexpected weight change. HENT: Positive for hearing loss and tinnitus. Negative for ear pain, sneezing, sore throat and trouble swallowing. Bilateral hearing loss and tinnitus. Migraine headache less frequent (once in 3 to 4 months)  in the last year. Eyes: Negative for pain and visual disturbance. Respiratory: Negative for cough, choking, chest tightness, shortness of breath and wheezing. Cardiovascular: Negative for chest pain, palpitations and leg swelling. Gastrointestinal: Negative for abdominal pain, diarrhea, nausea and vomiting. Endocrine: Negative. Genitourinary: Negative for dysuria, hematuria and urgency. Musculoskeletal: Negative for arthralgias, back pain and neck pain. Skin: Negative for color change and rash. Allergic/Immunologic: Negative. Neurological: Negative for seizures, syncope, weakness, light-headedness and numbness. Hematological: Negative. Psychiatric/Behavioral: Negative for confusion. All other systems reviewed and are negative.      Past Medical History:     Past Medical History:   Diagnosis Date   • Depression    • Headache     migraines   • Hearing aid worn     bilat   • Tinnitus    • Umbilical hernia    • Wears contact lenses    • Wears glasses       Past Surgical History:     Past Surgical History:   Procedure Laterality Date   • ADENOIDECTOMY     • HEMORRHOID SURGERY N/A 11/29/2017    Procedure: HEMORRHOIDECTOMY EXCISION EXTERNAL AND INTERNAL;  Surgeon: Abraham Ulrich MD;  Location: [facility];  Service: General   • HERNIA REPAIR     • KNEE SURGERY Left    • MI RPR UMBILICAL HRNA 5 YRS/> REDUCIBLE N/A 12/6/2018    Procedure: REPAIR HERNIA UMBILICAL  WITH    MESH;  Surgeon: Abraham Ulrich MD;  Location[facility];  Service: General   • MI TOTAL THYROID LOBECTOMY UNI W/WO ISTHMUSECTOMY Right 8/31/2017    Procedure: HEMITHYROIDECTOMY;  Surgeon: Manjeet Villeda MD;  Location:  MAIN OR;  Service: Surgical Oncology   • REFRACTIVE SURGERY     • THROAT SURGERY      for sleep apnea   • TONSILLECTOMY     • UVULOPALATOPHARYNGOPLASTY        Family History:     Family History   Problem Relation Age of Onset   • Stroke Mother    • Depression Mother    • Hypertension Mother    • Depression Father    • Melanoma Father    • Parkinsonism Father    • Bone cancer Maternal Grandmother    • Prostate cancer Paternal Grandfather    • Stroke Other    • Depression Other       Social History:     Social History     Socioeconomic History   • Marital status: /Civil Union     Spouse name: None   • Number of children: 2   • Years of education: [employer] degree   • Highest education level: None   Occupational History   • Occupation: currently working     Comment: fulltime   Tobacco Use   • Smoking status: Former     Packs/day: 0.25     Years: 10.00     Total pack years: 2.50     Types: Cigarettes     Quit date: 2014     Years since quittin.9   • Smokeless tobacco: Never   Vaping Use   • Vaping Use: Never used   Substance and Sexual Activity   • Alcohol use: Yes     Alcohol/week: 2.0 standard drinks of alcohol     Types: 2 Cans of beer per week     Comment: beer - 'few time' a week   • Drug use: No   • Sexual activity: None   Other Topics Concern   • None   Social History Narrative   • None     Social Determinants of Health     Financial Resource Strain: Not on file   Food Insecurity: Not on file   Transportation Needs: Not on file   Physical Activity: Not on file   Stress: Not on file   Social Connections: Not on file   Intimate Partner Violence: Not on file   Housing Stability: Not on file      Current Medications:     Current Outpatient Medications   Medication Sig Dispense Refill   • butalbital-acetaminophen-caffeine (FIORICET,ESGIC) -40 mg per tablet Take 1 tablet by mouth every 4 (four) hours as needed for migraine       • Multiple Vitamin (MULTI-VITAMIN DAILY) TABS Take 1 tablet by mouth daily     • sertraline (ZOLOFT) 100 mg tablet TAKE 1 TABLET BY MOUTH EVERY DAY 90 tablet 1     No current facility-administered medications for this visit.       Allergies:     No Known Allergies   Physical Exam:     /80   Pulse 70   Temp 98.2 °F (36.8 °C)   Ht 6' (1.829 m)   Wt 116 kg (256 lb)   SpO2 97%   BMI 34.72 kg/m²     Physical Exam  Vitals and nursing note reviewed. Constitutional:       General: He is not in acute distress. Appearance: He is well-developed. He is obese. HENT:      Head: Normocephalic and atraumatic. Mouth/Throat:      Mouth: Mucous membranes are moist.   Eyes:      Extraocular Movements: Extraocular movements intact. Conjunctiva/sclera: Conjunctivae normal.   Cardiovascular:      Rate and Rhythm: Normal rate and regular rhythm. Heart sounds: Normal heart sounds. No murmur heard. No gallop. Pulmonary:      Effort: Pulmonary effort is normal. No respiratory distress. Breath sounds: Normal breath sounds. No wheezing or rales. Abdominal:      Palpations: Abdomen is soft. Tenderness: There is no abdominal tenderness. Musculoskeletal:         General: No swelling. Cervical back: Neck supple. Right lower leg: No edema. Left lower leg: No edema. Skin:     General: Skin is warm and dry. Capillary Refill: Capillary refill takes less than 2 seconds. Coloration: Skin is not jaundiced or pale. Findings: No bruising or lesion. Neurological:      Mental Status: He is alert and oriented to person, place, and time. Psychiatric:         Mood and Affect: Mood normal.        Nutrition Assessment and Intervention:     Reviewed food recall journal      Other interventions: Patient eats well-balanced diet. Physical Activity Assessment and Intervention:    Activity journal reviewed      Other interventions: Gained 7 pounds within a year. Reports that he does not do regular exercise. He is physically active at work.       Emotional and Mental Well-being, Sleep, Connectedness Assessment and Intervention:    Sleep/stress assessment performed    Depression and anxiety screening performed and reviewed      Other interventions: KRISTIE +  Denied insomnia. Tobacco and Toxic Substance Assessment and Intervention:     Tobacco use screening performed    Alcohol and drug use screening performed      Other interventions: Denies cigarette smoking. Alcohol drinking on the weekends. Denies illegal drug use.     Therapeutic Lifestyle Change Visit:     One-on-one comprehensive counseling, coaching, and health behavior change visit completed      May Thu Juanito Victor MD  [address] [facility]

## 2023-09-13 ENCOUNTER — APPOINTMENT (OUTPATIENT)
Dept: LAB | Facility: MEDICAL CENTER | Age: 48
End: 2023-09-13
Payer: COMMERCIAL

## 2023-09-13 DIAGNOSIS — E78.2 MIXED HYPERLIPIDEMIA: ICD-10-CM

## 2023-09-13 LAB
ALBUMIN SERPL BCP-MCNC: 4.4 G/DL (ref 3.5–5)
ALP SERPL-CCNC: 47 U/L (ref 34–104)
ALT SERPL W P-5'-P-CCNC: 40 U/L (ref 7–52)
ANION GAP SERPL CALCULATED.3IONS-SCNC: 7 MMOL/L
AST SERPL W P-5'-P-CCNC: 20 U/L (ref 13–39)
BACTERIA UR QL AUTO: NORMAL /HPF
BASOPHILS # BLD AUTO: 0.03 THOUSANDS/ÂΜL (ref 0–0.1)
BASOPHILS NFR BLD AUTO: 1 % (ref 0–1)
BILIRUB SERPL-MCNC: 0.77 MG/DL (ref 0.2–1)
BILIRUB UR QL STRIP: NEGATIVE
BUN SERPL-MCNC: 15 MG/DL (ref 5–25)
CALCIUM SERPL-MCNC: 9.2 MG/DL (ref 8.4–10.2)
CHLORIDE SERPL-SCNC: 104 MMOL/L (ref 96–108)
CHOLEST SERPL-MCNC: 155 MG/DL
CLARITY UR: CLEAR
CO2 SERPL-SCNC: 28 MMOL/L (ref 21–32)
COLOR UR: NORMAL
CREAT SERPL-MCNC: 1.06 MG/DL (ref 0.6–1.3)
EOSINOPHIL # BLD AUTO: 0.2 THOUSAND/ÂΜL (ref 0–0.61)
EOSINOPHIL NFR BLD AUTO: 3 % (ref 0–6)
ERYTHROCYTE [DISTWIDTH] IN BLOOD BY AUTOMATED COUNT: 12.2 % (ref 11.6–15.1)
EST. AVERAGE GLUCOSE BLD GHB EST-MCNC: 117 MG/DL
GFR SERPL CREATININE-BSD FRML MDRD: 84 ML/MIN/1.73SQ M
GLUCOSE P FAST SERPL-MCNC: 97 MG/DL (ref 65–99)
GLUCOSE UR STRIP-MCNC: NEGATIVE MG/DL
HBA1C MFR BLD: 5.7 %
HCT VFR BLD AUTO: 46 % (ref 36.5–49.3)
HDLC SERPL-MCNC: 32 MG/DL
HGB BLD-MCNC: 15.3 G/DL (ref 12–17)
HGB UR QL STRIP.AUTO: NEGATIVE
IMM GRANULOCYTES # BLD AUTO: 0.04 THOUSAND/UL (ref 0–0.2)
IMM GRANULOCYTES NFR BLD AUTO: 1 % (ref 0–2)
KETONES UR STRIP-MCNC: NEGATIVE MG/DL
LDLC SERPL CALC-MCNC: 94 MG/DL (ref 0–100)
LEUKOCYTE ESTERASE UR QL STRIP: NEGATIVE
LYMPHOCYTES # BLD AUTO: 2.02 THOUSANDS/ÂΜL (ref 0.6–4.47)
LYMPHOCYTES NFR BLD AUTO: 32 % (ref 14–44)
MCH RBC QN AUTO: 28.7 PG (ref 26.8–34.3)
MCHC RBC AUTO-ENTMCNC: 33.3 G/DL (ref 31.4–37.4)
MCV RBC AUTO: 86 FL (ref 82–98)
MONOCYTES # BLD AUTO: 0.41 THOUSAND/ÂΜL (ref 0.17–1.22)
MONOCYTES NFR BLD AUTO: 7 % (ref 4–12)
NEUTROPHILS # BLD AUTO: 3.6 THOUSANDS/ÂΜL (ref 1.85–7.62)
NEUTS SEG NFR BLD AUTO: 56 % (ref 43–75)
NITRITE UR QL STRIP: NEGATIVE
NON-SQ EPI CELLS URNS QL MICRO: NORMAL /HPF
NONHDLC SERPL-MCNC: 123 MG/DL
NRBC BLD AUTO-RTO: 0 /100 WBCS
PH UR STRIP.AUTO: 7 [PH]
PLATELET # BLD AUTO: 296 THOUSANDS/UL (ref 149–390)
PMV BLD AUTO: 8.8 FL (ref 8.9–12.7)
POTASSIUM SERPL-SCNC: 5 MMOL/L (ref 3.5–5.3)
PROT SERPL-MCNC: 7 G/DL (ref 6.4–8.4)
PROT UR STRIP-MCNC: NEGATIVE MG/DL
RBC # BLD AUTO: 5.33 MILLION/UL (ref 3.88–5.62)
RBC #/AREA URNS AUTO: NORMAL /HPF
SODIUM SERPL-SCNC: 139 MMOL/L (ref 135–147)
SP GR UR STRIP.AUTO: 1.01 (ref 1–1.03)
TRIGL SERPL-MCNC: 147 MG/DL
UROBILINOGEN UR STRIP-ACNC: <2 MG/DL
WBC # BLD AUTO: 6.3 THOUSAND/UL (ref 4.31–10.16)
WBC #/AREA URNS AUTO: NORMAL /HPF

## 2023-09-13 PROCEDURE — 80061 LIPID PANEL: CPT

## 2023-09-15 ENCOUNTER — TELEPHONE (OUTPATIENT)
Age: 48
End: 2023-09-15

## 2023-09-15 ENCOUNTER — PREP FOR PROCEDURE (OUTPATIENT)
Age: 48
End: 2023-09-15

## 2023-09-15 DIAGNOSIS — Z12.11 SCREENING FOR COLON CANCER: Primary | ICD-10-CM

## 2023-09-15 NOTE — TELEPHONE ENCOUNTER
Scheduled date of colonoscopy (as of today): 10/13/2023    Physician performing colonoscopy: Ector    Location of colonoscopy: [facility]    Bowel prep reviewed with patient: Miralax/Dulcolax    Instructions reviewed with patient by: Kirsty Jay    Clearances: None

## 2023-09-15 NOTE — TELEPHONE ENCOUNTER
Screened by: Anel Miller     Referring Provider: Madeline Quiles     Weight: 240  Height: 6  BMI: 32.5     Has patient been referred for a routine screening Colonoscopy? Yes  Is the patient between 43-73 years old? Yes        Previous Colonoscopy? No    SCHEDULING STAFF: If the patient is between 45yrs-49yrs, please advise patient to confirm benefits/coverage with their insurance company for a routine screening colonoscopy, some insurance carriers will only cover at [facility] [address] or older. If the patient is over 66years old, please schedule an office visit. Does the patient want to see a Gastroenterologist prior to their procedure OR are they having any GI symptoms? No     Has the patient been hospitalized or had abdominal surgery in the past 6 months? No     Does the patient use supplemental oxygen? No    Does the patient take Coumadin, Lovenox, Plavix, Elliquis, Xarelto, or other blood thinning medication? No     Has the patient had a stroke, cardiac event, or stent placed in the past year? No      SCHEDULING STAFF: If patient answers NO to above questions, then schedule procedure. If patient answers YES to above questions, then schedule office appointment. If patient is between 45yrs - 49yrs, please advise patient that we will have to confirm benefits & coverage with their insurance company for a routine screening colonoscopy.

## 2023-09-26 ENCOUNTER — TELEPHONE (OUTPATIENT)
Dept: GASTROENTEROLOGY | Facility: CLINIC | Age: 48
End: 2023-09-26

## 2023-09-26 NOTE — TELEPHONE ENCOUNTER
LVM attempting to confirm 10/13 procedure, informing that a  is needed and that prep instructions sent via [address] [place] [address].

## 2023-10-12 RX ORDER — SODIUM CHLORIDE 9 MG/ML
125 INJECTION, SOLUTION INTRAVENOUS CONTINUOUS
Status: CANCELLED | OUTPATIENT
Start: 2023-10-12

## 2023-10-13 ENCOUNTER — ANESTHESIA (OUTPATIENT)
Dept: GASTROENTEROLOGY | Facility: MEDICAL CENTER | Age: 48
End: 2023-10-13

## 2023-10-13 ENCOUNTER — ANESTHESIA EVENT (OUTPATIENT)
Dept: GASTROENTEROLOGY | Facility: MEDICAL CENTER | Age: 48
End: 2023-10-13

## 2023-10-13 ENCOUNTER — HOSPITAL ENCOUNTER (OUTPATIENT)
Dept: GASTROENTEROLOGY | Facility: MEDICAL CENTER | Age: 48
Setting detail: OUTPATIENT SURGERY
End: 2023-10-13
Attending: INTERNAL MEDICINE
Payer: COMMERCIAL

## 2023-10-13 VITALS
HEART RATE: 74 BPM | RESPIRATION RATE: 20 BRPM | WEIGHT: 245 LBS | TEMPERATURE: 98.2 F | HEIGHT: 72 IN | SYSTOLIC BLOOD PRESSURE: 116 MMHG | DIASTOLIC BLOOD PRESSURE: 83 MMHG | OXYGEN SATURATION: 97 % | BODY MASS INDEX: 33.18 KG/M2

## 2023-10-13 DIAGNOSIS — Z12.11 SCREENING FOR COLON CANCER: ICD-10-CM

## 2023-10-13 PROCEDURE — 88305 TISSUE EXAM BY PATHOLOGIST: CPT | Performed by: STUDENT IN AN ORGANIZED HEALTH CARE EDUCATION/TRAINING PROGRAM

## 2023-10-13 RX ORDER — SODIUM CHLORIDE 9 MG/ML
125 INJECTION, SOLUTION INTRAVENOUS CONTINUOUS
Status: DISCONTINUED | OUTPATIENT
Start: 2023-10-13 | End: 2023-10-17 | Stop reason: HOSPADM

## 2023-10-13 RX ORDER — LIDOCAINE HYDROCHLORIDE 20 MG/ML
INJECTION, SOLUTION EPIDURAL; INFILTRATION; INTRACAUDAL; PERINEURAL AS NEEDED
Status: DISCONTINUED | OUTPATIENT
Start: 2023-10-13 | End: 2023-10-13

## 2023-10-13 RX ORDER — PROPOFOL 10 MG/ML
INJECTION, EMULSION INTRAVENOUS AS NEEDED
Status: DISCONTINUED | OUTPATIENT
Start: 2023-10-13 | End: 2023-10-13

## 2023-10-13 RX ADMIN — PROPOFOL 50 MG: 10 INJECTION, EMULSION INTRAVENOUS at 11:13

## 2023-10-13 RX ADMIN — SODIUM CHLORIDE 125 ML/HR: 0.9 INJECTION, SOLUTION INTRAVENOUS at 10:42

## 2023-10-13 RX ADMIN — Medication 40 MG: at 11:12

## 2023-10-13 RX ADMIN — PROPOFOL 150 MG: 10 INJECTION, EMULSION INTRAVENOUS at 11:07

## 2023-10-13 RX ADMIN — LIDOCAINE HYDROCHLORIDE 5 ML: 20 INJECTION, SOLUTION EPIDURAL; INFILTRATION; INTRACAUDAL at 11:07

## 2023-10-13 RX ADMIN — PROPOFOL 50 MG: 10 INJECTION, EMULSION INTRAVENOUS at 11:21

## 2023-10-13 RX ADMIN — PROPOFOL 50 MG: 10 INJECTION, EMULSION INTRAVENOUS at 11:26

## 2023-10-13 NOTE — ANESTHESIA PREPROCEDURE EVALUATION
Procedure:  COLONOSCOPY    Relevant Problems   CARDIO   (+) Migraine with aura and without status migrainosus, not intractable   (+) Mixed hyperlipidemia      MUSCULOSKELETAL   (+) Arthritis of left knee   (+) Cervical spondylosis without myelopathy   (+) Chronic low back pain      NEURO/PSYCH   (+) Chronic low back pain   (+) Depression   (+) Migraine with aura and without status migrainosus, not intractable      PULMONARY   (+) KRISTIE (obstructive sleep apnea)        Physical Exam    Airway    Mallampati score: II         Dental   No notable dental hx     Cardiovascular  Cardiovascular exam normal    Pulmonary  Pulmonary exam normal     Other Findings      Anesthesia Plan  ASA Score- 2     Anesthesia Type- IV sedation with anesthesia with ASA Monitors. Additional Monitors:     Airway Plan:            Plan Factors-Exercise tolerance (METS): >4 METS. Chart reviewed. Patient is not a current smoker. Patient instructed to abstain from smoking on day of procedure. Patient did not smoke on day of surgery. Induction- intravenous. Postoperative Plan-     Informed Consent- Anesthetic plan and risks discussed with patient.

## 2023-10-13 NOTE — ANESTHESIA POSTPROCEDURE EVALUATION
Post-Op Assessment Note    CV Status:  Stable    Pain management: adequate     Mental Status:  Alert and awake   Hydration Status:  Euvolemic   PONV Controlled:  Controlled   Airway Patency:  Patent      Post Op Vitals Reviewed: Yes            No notable events documented.     BP      Temp      Pulse     Resp      SpO2      /83   Pulse 74   Temp 98.2 °F (36.8 °C) (Temporal)   Resp 20   Ht 6' (1.829 m)   Wt 111 kg (245 lb)   SpO2 97%   BMI 33.23 kg/m²

## 2023-10-13 NOTE — H&P
History and Physical - [facility]  Vianca Cardenas 57 y.o. male MRN: [id number]                  HPI: Vianca Cardenas is a 57y.o. year old male who presents for colon cancer screening      REVIEW OF SYSTEMS: Per the HPI, and otherwise unremarkable.     Historical Information   Past Medical History:   Diagnosis Date    Depression     Headache     migraines    Hearing aid worn     bilat    Tinnitus     Umbilical hernia     Wears contact lenses     Wears glasses      Past Surgical History:   Procedure Laterality Date    ADENOIDECTOMY      HEMORRHOID SURGERY N/A 2017    Procedure: HEMORRHOIDECTOMY EXCISION EXTERNAL AND INTERNAL;  Surgeon: Oksana Manuel MD;  [facility];  Service: General    HERNIA REPAIR      umbilical    KNEE SURGERY Left     MS RPR UMBILICAL HRNA 5 YRS/> REDUCIBLE N/A 2018    Procedure: REPAIR HERNIA UMBILICAL  WITH    MESH;  Surgeon: Oksana Manuel MD;  [facility];  Service: General    MS TOTAL THYROID LOBECTOMY UNI W/WO ISTHMUSECTOMY Right 2017    Procedure: HEMITHYROIDECTOMY;  Surgeon: Tra Enriquez MD;  [facility];  Service: Surgical Oncology    REFRACTIVE SURGERY      THROAT SURGERY      for sleep apnea    TONSILLECTOMY      UVULOPALATOPHARYNGOPLASTY       Social History   Social History     Substance and Sexual Activity   Alcohol Use Yes    Alcohol/week: 2.0 standard drinks of alcohol    Types: 2 Cans of beer per week    Comment: beer - 'few time' a week     Social History     Substance and Sexual Activity   Drug Use No     Social History     Tobacco Use   Smoking Status Former    Packs/day: 0.25    Years: 10.00    Total pack years: 2.50    Types: Cigarettes    Quit date: 2014    Years since quittin.1   Smokeless Tobacco Never     Family History   Problem Relation Age of Onset    Stroke Mother     Depression Mother     Hypertension Mother     Depression Father     Melanoma Father     Parkinsonism Father Bone cancer Maternal Grandmother     Prostate cancer Paternal Grandfather     Stroke Other     Depression Other        Meds/Allergies       Current Outpatient Medications:     sertraline (ZOLOFT) 100 mg tablet    butalbital-acetaminophen-caffeine (FIORICET,ESGIC) -40 mg per tablet    Multiple Vitamin (MULTI-VITAMIN DAILY) TABS    No Known Allergies    Objective     /98   Pulse 76   Temp 98.2 °F (36.8 °C) (Temporal)   Resp 18   Ht 6' (1.829 m)   Wt 111 kg (245 lb)   SpO2 96%   BMI 33.23 kg/m²       PHYSICAL EXAM    Gen: NAD  Head: NCAT  CV: RRR  CHEST: Clear  ABD: soft, NT/ND  EXT: no edema      ASSESSMENT/PLAN:  This is a 57y.o. year old male here for colonoscopy, and he is stable and optimized for his procedure.

## 2023-10-17 PROCEDURE — 88305 TISSUE EXAM BY PATHOLOGIST: CPT | Performed by: STUDENT IN AN ORGANIZED HEALTH CARE EDUCATION/TRAINING PROGRAM

## 2023-10-21 PROBLEM — Z12.11 SCREENING FOR COLON CANCER: Status: RESOLVED | Noted: 2023-08-22 | Resolved: 2023-10-21

## 2023-10-21 PROBLEM — Z13.0 SCREENING FOR DEFICIENCY ANEMIA: Status: RESOLVED | Noted: 2023-08-22 | Resolved: 2023-10-21

## 2024-02-07 DIAGNOSIS — F32.A DEPRESSION, UNSPECIFIED DEPRESSION TYPE: ICD-10-CM

## 2024-02-07 RX ORDER — SERTRALINE HYDROCHLORIDE 100 MG/1
TABLET, FILM COATED ORAL
Qty: 90 TABLET | Refills: 1 | Status: SHIPPED | OUTPATIENT
Start: 2024-02-07

## 2024-08-10 DIAGNOSIS — F32.A DEPRESSION, UNSPECIFIED DEPRESSION TYPE: ICD-10-CM

## 2024-08-11 RX ORDER — SERTRALINE HYDROCHLORIDE 100 MG/1
TABLET, FILM COATED ORAL
Qty: 30 TABLET | Refills: 0 | Status: SHIPPED | OUTPATIENT
Start: 2024-08-11

## 2024-08-26 ENCOUNTER — OFFICE VISIT (OUTPATIENT)
Dept: INTERNAL MEDICINE CLINIC | Facility: CLINIC | Age: 49
End: 2024-08-26

## 2024-08-26 VITALS
DIASTOLIC BLOOD PRESSURE: 86 MMHG | HEIGHT: 72 IN | TEMPERATURE: 97.5 F | WEIGHT: 249 LBS | SYSTOLIC BLOOD PRESSURE: 120 MMHG | HEART RATE: 80 BPM | BODY MASS INDEX: 33.72 KG/M2 | OXYGEN SATURATION: 96 %

## 2024-08-26 DIAGNOSIS — G43.109 MIGRAINE WITH AURA AND WITHOUT STATUS MIGRAINOSUS, NOT INTRACTABLE: ICD-10-CM

## 2024-08-26 DIAGNOSIS — Z13.1 SCREENING FOR DIABETES MELLITUS: ICD-10-CM

## 2024-08-26 DIAGNOSIS — Z00.00 ANNUAL PHYSICAL EXAM: Primary | ICD-10-CM

## 2024-08-26 DIAGNOSIS — H91.93 BILATERAL HEARING LOSS, UNSPECIFIED HEARING LOSS TYPE: ICD-10-CM

## 2024-08-26 DIAGNOSIS — G47.33 OSA (OBSTRUCTIVE SLEEP APNEA): ICD-10-CM

## 2024-08-26 DIAGNOSIS — Z85.850 HISTORY OF THYROID CANCER: ICD-10-CM

## 2024-08-26 DIAGNOSIS — G89.29 CHRONIC MIDLINE LOW BACK PAIN WITHOUT SCIATICA: ICD-10-CM

## 2024-08-26 DIAGNOSIS — Z13.0 SCREENING FOR DEFICIENCY ANEMIA: ICD-10-CM

## 2024-08-26 DIAGNOSIS — M54.50 CHRONIC MIDLINE LOW BACK PAIN WITHOUT SCIATICA: ICD-10-CM

## 2024-08-26 DIAGNOSIS — F32.A DEPRESSION, UNSPECIFIED DEPRESSION TYPE: ICD-10-CM

## 2024-08-26 DIAGNOSIS — E78.2 MIXED HYPERLIPIDEMIA: ICD-10-CM

## 2024-08-26 PROBLEM — R73.09 ELEVATED GLUCOSE: Status: RESOLVED | Noted: 2017-04-07 | Resolved: 2024-08-26

## 2024-08-26 RX ORDER — NAPROXEN SODIUM 220 MG
220 TABLET ORAL EVERY 12 HOURS PRN
COMMUNITY

## 2024-08-26 NOTE — PROGRESS NOTES
INTERNAL MEDICINE OFFICE VISIT       NAME: Samuel Sánchez  AGE: 48 y.o. SEX: male       : 1975        MRN: [id number]    DATE: 2024  TIME: 8:10 AM    Assessment and Plan   1. Annual physical exam  2. Depression, unspecified depression type  3. Bilateral hearing loss, unspecified hearing loss type  4. Migraine with aura and without status migrainosus, not intractable  5. KRISTIE (obstructive sleep apnea)  6. History of thyroid cancer  -     TSH, 3rd generation  7. Chronic midline low back pain without sciatica  8. Mixed hyperlipidemia  -     Lipid panel; Future  9. Screening for diabetes mellitus  -     Comprehensive metabolic panel  -     Urinalysis with microscopic; Future  -     Hemoglobin A1C  10. Screening for deficiency anemia  -     CBC and differential       Follow-up in 1 year.  Lab work in the near future at Samuel's convenience        Chief Complaint   Annual physical    History of Present Illness   Samuel Sánchez is a 48 y.o.-year-old male who returns for an annual physical and is feeling well overall.  Samuel and his wife Kiara are busy raising their son who is in high school and daughter who is in middle school.  All is going well.  Work is busy and he is traveling a lot but going well.    Samuel has had no significant illnesses since last visit 1 year ago.  Dental care is up-to-date.  He is due for 2-year eye exam and I reminded him to make an appointment.  He is well overdue for skin surveillance and has a family history of melanoma.  I emphasized the importance of this and he is going to call [facility] dermatology group[facility], whom he last saw before COVID started.  He reports no new skin lesions and no skin cancer was noted today on exam.    He was released from follow-up by neurosurgery for small cavernous hemangioma.  This visit was .  He has not had a change in his headache pattern and does have migraine headaches, which occur intermittently and respond to Oestreich  as needed.    Samuel sees a chiropractor about every 2 weeks for chronic low back pain and this is effective.  Since his cervical surgery, neck pain has resolved and this is a big positive for him.    He was released from any active follow-up regarding history of thyroid cancer and is cured with surgery.  Will check TSH as part of annual lab work.    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  There are no symptoms of depression.    Status post uvulopalatoplasty, Samuel reports no current sleep apnea symptoms.    Chronic tinnitus with decreased hearing is unchanged.  He found that hearing aids were minimally helpful and inconvenient.  Plan is observation.    Colonoscopy was performed last year and is up-to-date.    No new problems are reported.    Review of Systems   Review of Systems   Constitutional: Negative.    HENT:  Positive for hearing loss and tinnitus.    Eyes: Negative.    Respiratory: Negative.     Cardiovascular: Negative.    Gastrointestinal: Negative.    Genitourinary: Negative.    Musculoskeletal:  Positive for arthralgias and back pain. Negative for gait problem, joint swelling, myalgias and neck pain.   Skin: Negative.    Allergic/Immunologic: Positive for environmental allergies.   Neurological: Negative.    Hematological: Negative.    Psychiatric/Behavioral: Negative.         Active Problem List     Patient Active Problem List   Diagnosis    External hemorrhoids    Allergic rhinitis    Bilateral hearing loss    Arthritis of left knee    Cavernous hemangioma of brain (HCC)    Cervical spondylosis without myelopathy    Chronic low back pain    Depression    KRISTIE (obstructive sleep apnea)    Class 1 obesity due to excess calories in adult    Family history of melanoma    History of thyroid cancer    Mixed hyperlipidemia    Migraine with aura and without status migrainosus, not intractable       The following portions of the patient's history were reviewed and  updated as appropriate: allergies, current medications, past family history, past medical history, past social history, past surgical history, and problem list.    Objective     Vitals:    08/26/24 0751   BP: 120/86   Pulse: 80   Temp: 97.5 °F (36.4 °C)   SpO2: 96%     Wt Readings from Last 3 Encounters:   08/26/24 113 kg (249 lb)   10/13/23 111 kg (245 lb)   08/22/23 116 kg (256 lb)       Physical Exam    VSS, afebrile, pulse regular    Alert and Oriented in NAD    HEENT: NCAT, Pupils equal, 3 mm, EOEMI, no icterus or pallor, no iritis or conjunctivitis. No head or neck mass or adenopathy.     Ears:  normal-appearing external auditory canals and tympanic membranes,     Nose: patent nasal passages without polyps or masses, no septal deviation, no nasal deformity,     Oral cavity and throat: normal dentition, no gum disease, normal mucosa, patent airway, no hemorrhages or exudates in the throat.  Exam of salivary glands and ducts are normal. No submandibular or submental adenopathy    Neck: supple, no trauma or tenderness.  No JVD.  Normal carotid upstroke and descent without bruits.  Trachea midline without stridor.  There is a well-healed horizontal thyroidectomy scar at the base of the anterior neck with no palpable thyroid tissue, no mass and no regional adenopathy.  No spinal tenderness, full range of motion.    Lymphatics: no adenopathy throughout    Chest:  No trauma or deformity, no supraclavicular adenopathy or bruit.  Chest wall expansion is symmetric and normal, expiratory phase is not prolonged.    Heart:  Regular rate and rhythm, normal S1-S2, no S4-S3, no clicks murmurs or rubs.    Lungs:  Clear to auscultation and normal to percussion.    Back:  No trauma or deformity, no CVA mass or CVA tenderness.    Skin:  No rash or skin malignancy.    Abdomen:  Nondistended, normal bowel sounds, soft nontender without masses bruits organomegaly.  There is no hernia.  There are no surgical scars.    Extremities:   Arterial circulation is symmetrically normal with no clubbing cyanosis or edema.  There are no varicosities, there is no calf tenderness or phlebitic findings.    Joints:  No deformity, swelling, redness, tenderness or increased temperature, no instability.  There are no tophi.    Affect:  Normal    Neurologic:  Normal and stable gait, and otherwise no focal findings as well.    Cognitive:  Intact.       Pertinent Laboratory/Diagnostic Studies:        Orders Placed This Encounter   Procedures    CBC and differential    Comprehensive metabolic panel    Lipid panel    Urinalysis with microscopic    Hemoglobin A1C    TSH, 3rd generation       ALLERGIES:  No Known Allergies    Current Medications     Current Outpatient Medications   Medication Sig Dispense Refill    butalbital-acetaminophen-caffeine (FIORICET,ESGIC) -40 mg per tablet Take 1 tablet by mouth every 4 (four) hours as needed for migraine 30 tablet 0    Multiple Vitamin (MULTI-VITAMIN DAILY) TABS Take 1 tablet by mouth daily      naproxen sodium (Aleve) 220 MG tablet Take 220 mg by mouth every 12 (twelve) hours as needed for mild pain      sertraline (ZOLOFT) 100 mg tablet TAKE 1 TABLET BY MOUTH EVERY DAY 30 tablet 0     No current facility-administered medications for this visit.         Health Maintenance        Elian Atwood MD

## 2024-09-13 ENCOUNTER — APPOINTMENT (OUTPATIENT)
Dept: LAB | Facility: MEDICAL CENTER | Age: 49
End: 2024-09-13
Payer: COMMERCIAL

## 2024-09-13 DIAGNOSIS — Z13.1 SCREENING FOR DIABETES MELLITUS: ICD-10-CM

## 2024-09-13 DIAGNOSIS — E78.2 MIXED HYPERLIPIDEMIA: ICD-10-CM

## 2024-09-13 DIAGNOSIS — F32.A DEPRESSION, UNSPECIFIED DEPRESSION TYPE: ICD-10-CM

## 2024-09-13 LAB
ALBUMIN SERPL BCG-MCNC: 4.5 G/DL (ref 3.5–5)
ALP SERPL-CCNC: 55 U/L (ref 34–104)
ALT SERPL W P-5'-P-CCNC: 41 U/L (ref 7–52)
ANION GAP SERPL CALCULATED.3IONS-SCNC: 8 MMOL/L (ref 4–13)
AST SERPL W P-5'-P-CCNC: 20 U/L (ref 13–39)
BACTERIA UR QL AUTO: NORMAL /HPF
BASOPHILS # BLD AUTO: 0.06 THOUSANDS/ÂΜL (ref 0–0.1)
BASOPHILS NFR BLD AUTO: 1 % (ref 0–1)
BILIRUB SERPL-MCNC: 0.59 MG/DL (ref 0.2–1)
BILIRUB UR QL STRIP: NEGATIVE
BUN SERPL-MCNC: 12 MG/DL (ref 5–25)
CALCIUM SERPL-MCNC: 9.4 MG/DL (ref 8.4–10.2)
CHLORIDE SERPL-SCNC: 103 MMOL/L (ref 96–108)
CHOLEST SERPL-MCNC: 201 MG/DL
CLARITY UR: CLEAR
CO2 SERPL-SCNC: 29 MMOL/L (ref 21–32)
COLOR UR: COLORLESS
CREAT SERPL-MCNC: 0.95 MG/DL (ref 0.6–1.3)
EOSINOPHIL # BLD AUTO: 0.25 THOUSAND/ÂΜL (ref 0–0.61)
EOSINOPHIL NFR BLD AUTO: 4 % (ref 0–6)
ERYTHROCYTE [DISTWIDTH] IN BLOOD BY AUTOMATED COUNT: 12.5 % (ref 11.6–15.1)
EST. AVERAGE GLUCOSE BLD GHB EST-MCNC: 114 MG/DL
GFR SERPL CREATININE-BSD FRML MDRD: 95 ML/MIN/1.73SQ M
GLUCOSE P FAST SERPL-MCNC: 109 MG/DL (ref 65–99)
GLUCOSE UR STRIP-MCNC: NEGATIVE MG/DL
HBA1C MFR BLD: 5.6 %
HCT VFR BLD AUTO: 47 % (ref 36.5–49.3)
HDLC SERPL-MCNC: 44 MG/DL
HGB BLD-MCNC: 15.7 G/DL (ref 12–17)
HGB UR QL STRIP.AUTO: NEGATIVE
IMM GRANULOCYTES # BLD AUTO: 0.03 THOUSAND/UL (ref 0–0.2)
IMM GRANULOCYTES NFR BLD AUTO: 0 % (ref 0–2)
KETONES UR STRIP-MCNC: NEGATIVE MG/DL
LDLC SERPL CALC-MCNC: 114 MG/DL (ref 0–100)
LEUKOCYTE ESTERASE UR QL STRIP: NEGATIVE
LYMPHOCYTES # BLD AUTO: 1.68 THOUSANDS/ÂΜL (ref 0.6–4.47)
LYMPHOCYTES NFR BLD AUTO: 23 % (ref 14–44)
MCH RBC QN AUTO: 29.6 PG (ref 26.8–34.3)
MCHC RBC AUTO-ENTMCNC: 33.4 G/DL (ref 31.4–37.4)
MCV RBC AUTO: 89 FL (ref 82–98)
MONOCYTES # BLD AUTO: 0.43 THOUSAND/ÂΜL (ref 0.17–1.22)
MONOCYTES NFR BLD AUTO: 6 % (ref 4–12)
NEUTROPHILS # BLD AUTO: 4.75 THOUSANDS/ÂΜL (ref 1.85–7.62)
NEUTS SEG NFR BLD AUTO: 66 % (ref 43–75)
NITRITE UR QL STRIP: NEGATIVE
NON-SQ EPI CELLS URNS QL MICRO: NORMAL /HPF
NONHDLC SERPL-MCNC: 157 MG/DL
NRBC BLD AUTO-RTO: 0 /100 WBCS
PH UR STRIP.AUTO: 7 [PH]
PLATELET # BLD AUTO: 281 THOUSANDS/UL (ref 149–390)
PMV BLD AUTO: 9.1 FL (ref 8.9–12.7)
POTASSIUM SERPL-SCNC: 4.6 MMOL/L (ref 3.5–5.3)
PROT SERPL-MCNC: 6.9 G/DL (ref 6.4–8.4)
PROT UR STRIP-MCNC: NEGATIVE MG/DL
RBC # BLD AUTO: 5.3 MILLION/UL (ref 3.88–5.62)
RBC #/AREA URNS AUTO: NORMAL /HPF
SODIUM SERPL-SCNC: 140 MMOL/L (ref 135–147)
SP GR UR STRIP.AUTO: 1.01 (ref 1–1.03)
TRIGL SERPL-MCNC: 216 MG/DL
TSH SERPL DL<=0.05 MIU/L-ACNC: 1.87 UIU/ML (ref 0.45–4.5)
UROBILINOGEN UR STRIP-ACNC: <2 MG/DL
WBC # BLD AUTO: 7.2 THOUSAND/UL (ref 4.31–10.16)
WBC #/AREA URNS AUTO: NORMAL /HPF

## 2024-09-13 PROCEDURE — 81001 URINALYSIS AUTO W/SCOPE: CPT

## 2024-09-13 PROCEDURE — 80061 LIPID PANEL: CPT

## 2024-09-13 RX ORDER — SERTRALINE HYDROCHLORIDE 100 MG/1
TABLET, FILM COATED ORAL
Qty: 90 TABLET | Refills: 1 | Status: SHIPPED | OUTPATIENT
Start: 2024-09-13

## 2024-09-15 DIAGNOSIS — E78.2 MIXED HYPERLIPIDEMIA: Primary | ICD-10-CM

## 2025-03-27 DIAGNOSIS — F32.A DEPRESSION, UNSPECIFIED DEPRESSION TYPE: ICD-10-CM

## 2025-03-27 RX ORDER — SERTRALINE HYDROCHLORIDE 100 MG/1
100 TABLET, FILM COATED ORAL DAILY
Qty: 90 TABLET | Refills: 1 | Status: SHIPPED | OUTPATIENT
Start: 2025-03-27

## (undated) DEVICE — GLOVE INDICATOR PI UNDERGLOVE SZ 7 BLUE

## (undated) DEVICE — SUT VICRYL 3-0 SH 27 IN J416H

## (undated) DEVICE — REM POLYHESIVE ADULT PATIENT RETURN ELECTRODE: Brand: VALLEYLAB

## (undated) DEVICE — 3M™ STERI-STRIP™ REINFORCED ADHESIVE SKIN CLOSURES, R1547, 1/2 IN X 4 IN (12 MM X 100 MM), 6 STRIPS/ENVELOPE: Brand: 3M™ STERI-STRIP™

## (undated) DEVICE — LIGACLIP MCA MULTIPLE CLIP APPLIERS, 20 SMALL CLIPS: Brand: LIGACLIP

## (undated) DEVICE — NEEDLE 25G X 1 1/2

## (undated) DEVICE — SUT MONOCRYL 5-0 P-3 18 IN Y493G

## (undated) DEVICE — SURGICEL 4 X 8

## (undated) DEVICE — CONMED ACCESSORY ELECTRODE, FLAT BLADE WITH EXTENDED INSULATION: Brand: CONMED

## (undated) DEVICE — 2963 MEDIPORE SOFT CLOTH TAPE 3 IN X 10 YD 12 RLS/CS: Brand: 3M™ MEDIPORE™

## (undated) DEVICE — 3000CC GUARDIAN II: Brand: GUARDIAN

## (undated) DEVICE — SCD SEQUENTIAL COMPRESSION COMFORT SLEEVE MEDIUM KNEE LENGTH: Brand: KENDALL SCD

## (undated) DEVICE — CHLORAPREP HI-LITE 26ML ORANGE

## (undated) DEVICE — SUT MONOCRYL 4-0 PS-2 27 IN Y426H

## (undated) DEVICE — TELFA NON-ADHERENT ABSORBENT DRESSING: Brand: TELFA

## (undated) DEVICE — GLOVE SRG BIOGEL 6.5

## (undated) DEVICE — SUT SILK 2-0 SH 30 IN K833H

## (undated) DEVICE — INTENDED FOR TISSUE SEPARATION, AND OTHER PROCEDURES THAT REQUIRE A SHARP SURGICAL BLADE TO PUNCTURE OR CUT.: Brand: BARD-PARKER SAFETY BLADES SIZE 15, STERILE

## (undated) DEVICE — 3M™ STERI-STRIP™ COMPOUND BENZOIN TINCTURE 40 BAGS/CARTON 4 CARTONS/CASE C1544: Brand: 3M™ STERI-STRIP™

## (undated) DEVICE — SUT VICRYL 4-0 PS-2 27 IN J426H

## (undated) DEVICE — PACK UNIVERSAL NECK

## (undated) DEVICE — 2000CC GUARDIAN II: Brand: GUARDIAN

## (undated) DEVICE — BETHLEHEM UNIVERSAL MINOR GEN: Brand: CARDINAL HEALTH

## (undated) DEVICE — TUBING SUCTION 5MM X 12 FT

## (undated) DEVICE — MINOR PROCEDURE DRAPE: Brand: CONVERTORS

## (undated) DEVICE — SUT PROLENE 1 CT-1 30 IN 8425H

## (undated) DEVICE — BINDER ABDOMINAL 46-62 IN

## (undated) DEVICE — GLOVE SRG BIOGEL ECLIPSE 7

## (undated) DEVICE — CRADLE EXTREMITY UNIVERSAL CONTOURED

## (undated) DEVICE — GAUZE SPONGES,16 PLY: Brand: CURITY

## (undated) DEVICE — DRESSING MEPILEX AG BORDER 4 X 4 IN

## (undated) DEVICE — MEDI-VAC YANKAUER SUCTION HANDLE W/BULBOUS AND CONTROL VENT: Brand: CARDINAL HEALTH

## (undated) DEVICE — SPONGE STICK WITH PVP-I: Brand: KENDALL

## (undated) DEVICE — SUT SILK 2-0 18 IN A185H

## (undated) DEVICE — GLOVE INDICATOR PI UNDERGLOVE SZ 6.5 BLUE

## (undated) DEVICE — BIPOLAR CORD DISP

## (undated) DEVICE — SUT CHROMIC 2-0 SH 27 IN G123H

## (undated) DEVICE — GAUZE SPONGES,USP TYPE VII GAUZE, 12 PLY: Brand: CURITY

## (undated) DEVICE — STRL UNIVERSAL MINOR GENERAL: Brand: CARDINAL HEALTH

## (undated) DEVICE — DRAPE EQUIPMENT RF WAND

## (undated) DEVICE — DRAPE SURGIKIT SADDLE BAG

## (undated) DEVICE — SUT SILK 3-0 18 IN A184H

## (undated) DEVICE — SEPRA FILM 6 X 5

## (undated) DEVICE — PLUMEPEN PRO 10FT

## (undated) DEVICE — BLADE ELECTRODE: Brand: EDGE

## (undated) DEVICE — VIOLET BRAIDED (POLYGLACTIN 910), SYNTHETIC ABSORBABLE SUTURE: Brand: COATED VICRYL

## (undated) DEVICE — SUT VICRYL 3-0 18 IN J110T

## (undated) DEVICE — SUT VICRYL 2-0 SH 27 IN UNDYED J417H

## (undated) DEVICE — GLOVE SRG BIOGEL 7

## (undated) DEVICE — POSITION CUSHION INSERT LARGE PRONEVIEW